# Patient Record
Sex: FEMALE | Race: WHITE | Employment: UNEMPLOYED | URBAN - METROPOLITAN AREA
[De-identification: names, ages, dates, MRNs, and addresses within clinical notes are randomized per-mention and may not be internally consistent; named-entity substitution may affect disease eponyms.]

---

## 2022-12-19 ENCOUNTER — APPOINTMENT (OUTPATIENT)
Dept: GENERAL RADIOLOGY | Age: 52
End: 2022-12-19
Payer: MEDICAID

## 2022-12-19 ENCOUNTER — APPOINTMENT (OUTPATIENT)
Dept: CT IMAGING | Age: 52
End: 2022-12-19
Payer: MEDICAID

## 2022-12-19 ENCOUNTER — HOSPITAL ENCOUNTER (INPATIENT)
Age: 52
LOS: 2 days | Discharge: HOME | End: 2022-12-22
Attending: EMERGENCY MEDICINE | Admitting: INTERNAL MEDICINE
Payer: MEDICAID

## 2022-12-19 DIAGNOSIS — R19.7 NAUSEA VOMITING AND DIARRHEA: ICD-10-CM

## 2022-12-19 DIAGNOSIS — K52.9 ENTERITIS: ICD-10-CM

## 2022-12-19 DIAGNOSIS — R11.2 NAUSEA VOMITING AND DIARRHEA: ICD-10-CM

## 2022-12-19 DIAGNOSIS — R10.9 ABDOMINAL PAIN, UNSPECIFIED ABDOMINAL LOCATION: ICD-10-CM

## 2022-12-19 DIAGNOSIS — K57.32 DIVERTICULITIS OF COLON: Primary | ICD-10-CM

## 2022-12-19 DIAGNOSIS — I49.8 VENTRICULAR BIGEMINY: ICD-10-CM

## 2022-12-19 LAB
A/G RATIO: 1.5 (ref 1.1–2.2)
ALBUMIN SERPL-MCNC: 4.8 G/DL (ref 3.4–5)
ALP BLD-CCNC: 85 U/L (ref 40–129)
ALT SERPL-CCNC: 11 U/L (ref 10–40)
ANION GAP SERPL CALCULATED.3IONS-SCNC: 18 MMOL/L (ref 3–16)
APTT: 29.4 SEC (ref 23–34.3)
AST SERPL-CCNC: 17 U/L (ref 15–37)
BASE EXCESS VENOUS: -3.9 MMOL/L (ref -3–3)
BASOPHILS ABSOLUTE: 0.1 K/UL (ref 0–0.2)
BASOPHILS RELATIVE PERCENT: 0.4 %
BILIRUB SERPL-MCNC: 0.3 MG/DL (ref 0–1)
BUN BLDV-MCNC: 10 MG/DL (ref 7–20)
CALCIUM SERPL-MCNC: 10.2 MG/DL (ref 8.3–10.6)
CHLORIDE BLD-SCNC: 105 MMOL/L (ref 99–110)
CO2: 20 MMOL/L (ref 21–32)
CREAT SERPL-MCNC: <0.5 MG/DL (ref 0.6–1.1)
EOSINOPHILS ABSOLUTE: 0.1 K/UL (ref 0–0.6)
EOSINOPHILS RELATIVE PERCENT: 0.4 %
GFR SERPL CREATININE-BSD FRML MDRD: >60 ML/MIN/{1.73_M2}
GLUCOSE BLD-MCNC: 238 MG/DL (ref 70–99)
HCO3 VENOUS: 21.1 MMOL/L (ref 23–29)
HCT VFR BLD CALC: 40 % (ref 36–48)
HEMOGLOBIN: 13.5 G/DL (ref 12–16)
LACTIC ACID: 4.5 MMOL/L (ref 0.4–2)
LYMPHOCYTES ABSOLUTE: 2.3 K/UL (ref 1–5.1)
LYMPHOCYTES RELATIVE PERCENT: 11.4 %
MAGNESIUM: 1.9 MG/DL (ref 1.8–2.4)
MCH RBC QN AUTO: 30.4 PG (ref 26–34)
MCHC RBC AUTO-ENTMCNC: 33.7 G/DL (ref 31–36)
MCV RBC AUTO: 90.2 FL (ref 80–100)
MONOCYTES ABSOLUTE: 0.8 K/UL (ref 0–1.3)
MONOCYTES RELATIVE PERCENT: 4 %
NEUTROPHILS ABSOLUTE: 17.3 K/UL (ref 1.7–7.7)
NEUTROPHILS RELATIVE PERCENT: 83.8 %
O2 SAT, VEN: 99 %
O2 THERAPY: ABNORMAL
PCO2, VEN: 35.2 MMHG (ref 40–50)
PDW BLD-RTO: 13.4 % (ref 12.4–15.4)
PH VENOUS: 7.39 (ref 7.35–7.45)
PLATELET # BLD: 257 K/UL (ref 135–450)
PMV BLD AUTO: 9.3 FL (ref 5–10.5)
PO2, VEN: 170.7 MMHG (ref 25–40)
POTASSIUM SERPL-SCNC: 3.6 MMOL/L (ref 3.5–5.1)
PRO-BNP: 386 PG/ML (ref 0–124)
RBC # BLD: 4.44 M/UL (ref 4–5.2)
SODIUM BLD-SCNC: 143 MMOL/L (ref 136–145)
TCO2 CALC VENOUS: 20 MMOL/L
TOTAL PROTEIN: 7.9 G/DL (ref 6.4–8.2)
TROPONIN: <0.01 NG/ML
WBC # BLD: 20.6 K/UL (ref 4–11)

## 2022-12-19 PROCEDURE — 2580000003 HC RX 258: Performed by: EMERGENCY MEDICINE

## 2022-12-19 PROCEDURE — 85730 THROMBOPLASTIN TIME PARTIAL: CPT

## 2022-12-19 PROCEDURE — 87040 BLOOD CULTURE FOR BACTERIA: CPT

## 2022-12-19 PROCEDURE — 82803 BLOOD GASES ANY COMBINATION: CPT

## 2022-12-19 PROCEDURE — 6360000004 HC RX CONTRAST MEDICATION: Performed by: EMERGENCY MEDICINE

## 2022-12-19 PROCEDURE — 93005 ELECTROCARDIOGRAM TRACING: CPT | Performed by: EMERGENCY MEDICINE

## 2022-12-19 PROCEDURE — 87086 URINE CULTURE/COLONY COUNT: CPT

## 2022-12-19 PROCEDURE — 83605 ASSAY OF LACTIC ACID: CPT

## 2022-12-19 PROCEDURE — 85025 COMPLETE CBC W/AUTO DIFF WBC: CPT

## 2022-12-19 PROCEDURE — 96361 HYDRATE IV INFUSION ADD-ON: CPT

## 2022-12-19 PROCEDURE — 96374 THER/PROPH/DIAG INJ IV PUSH: CPT

## 2022-12-19 PROCEDURE — 74177 CT ABD & PELVIS W/CONTRAST: CPT

## 2022-12-19 PROCEDURE — 84484 ASSAY OF TROPONIN QUANT: CPT

## 2022-12-19 PROCEDURE — 83735 ASSAY OF MAGNESIUM: CPT

## 2022-12-19 PROCEDURE — 6360000002 HC RX W HCPCS: Performed by: EMERGENCY MEDICINE

## 2022-12-19 PROCEDURE — 99285 EMERGENCY DEPT VISIT HI MDM: CPT

## 2022-12-19 PROCEDURE — 71045 X-RAY EXAM CHEST 1 VIEW: CPT

## 2022-12-19 PROCEDURE — 96375 TX/PRO/DX INJ NEW DRUG ADDON: CPT

## 2022-12-19 PROCEDURE — 80053 COMPREHEN METABOLIC PANEL: CPT

## 2022-12-19 PROCEDURE — 83880 ASSAY OF NATRIURETIC PEPTIDE: CPT

## 2022-12-19 RX ORDER — PROCHLORPERAZINE EDISYLATE 5 MG/ML
5 INJECTION INTRAMUSCULAR; INTRAVENOUS ONCE
Status: COMPLETED | OUTPATIENT
Start: 2022-12-19 | End: 2022-12-19

## 2022-12-19 RX ORDER — ONDANSETRON 2 MG/ML
4 INJECTION INTRAMUSCULAR; INTRAVENOUS ONCE
Status: DISCONTINUED | OUTPATIENT
Start: 2022-12-19 | End: 2022-12-19

## 2022-12-19 RX ORDER — 0.9 % SODIUM CHLORIDE 0.9 %
1000 INTRAVENOUS SOLUTION INTRAVENOUS ONCE
Status: COMPLETED | OUTPATIENT
Start: 2022-12-19 | End: 2022-12-19

## 2022-12-19 RX ORDER — FENTANYL CITRATE 50 UG/ML
50 INJECTION, SOLUTION INTRAMUSCULAR; INTRAVENOUS ONCE
Status: COMPLETED | OUTPATIENT
Start: 2022-12-19 | End: 2022-12-19

## 2022-12-19 RX ADMIN — IOHEXOL 100 ML: 350 INJECTION, SOLUTION INTRAVENOUS at 21:44

## 2022-12-19 RX ADMIN — SODIUM CHLORIDE 1000 ML: 0.9 INJECTION, SOLUTION INTRAVENOUS at 20:51

## 2022-12-19 RX ADMIN — FENTANYL CITRATE 50 MCG: 50 INJECTION INTRAMUSCULAR; INTRAVENOUS at 20:50

## 2022-12-19 RX ADMIN — PROCHLORPERAZINE EDISYLATE 5 MG: 5 INJECTION INTRAMUSCULAR; INTRAVENOUS at 21:06

## 2022-12-19 ASSESSMENT — PAIN - FUNCTIONAL ASSESSMENT: PAIN_FUNCTIONAL_ASSESSMENT: 0-10

## 2022-12-19 ASSESSMENT — PAIN DESCRIPTION - LOCATION: LOCATION: ABDOMEN

## 2022-12-19 ASSESSMENT — PAIN SCALES - GENERAL
PAINLEVEL_OUTOF10: 2
PAINLEVEL_OUTOF10: 7

## 2022-12-19 ASSESSMENT — PAIN DESCRIPTION - DESCRIPTORS: DESCRIPTORS: ACHING

## 2022-12-20 PROBLEM — K57.92 DIVERTICULITIS: Status: ACTIVE | Noted: 2022-12-20

## 2022-12-20 PROBLEM — R10.9 CONTINUOUS SEVERE ABDOMINAL PAIN: Status: ACTIVE | Noted: 2022-12-20

## 2022-12-20 LAB
AMORPHOUS: ABNORMAL /HPF
ANION GAP SERPL CALCULATED.3IONS-SCNC: 9 MMOL/L (ref 3–16)
BACTERIA: ABNORMAL /HPF
BASOPHILS ABSOLUTE: 0 K/UL (ref 0–0.2)
BASOPHILS RELATIVE PERCENT: 0.5 %
BILIRUBIN URINE: NEGATIVE
BLOOD, URINE: ABNORMAL
BUN BLDV-MCNC: 6 MG/DL (ref 7–20)
C DIFF TOXIN/ANTIGEN: NORMAL
CALCIUM SERPL-MCNC: 9 MG/DL (ref 8.3–10.6)
CHLORIDE BLD-SCNC: 107 MMOL/L (ref 99–110)
CLARITY: CLEAR
CO2: 26 MMOL/L (ref 21–32)
COLOR: YELLOW
CREAT SERPL-MCNC: <0.5 MG/DL (ref 0.6–1.1)
EKG ATRIAL RATE: 49 BPM
EKG ATRIAL RATE: 59 BPM
EKG DIAGNOSIS: NORMAL
EKG DIAGNOSIS: NORMAL
EKG P AXIS: 64 DEGREES
EKG P-R INTERVAL: 164 MS
EKG Q-T INTERVAL: 446 MS
EKG Q-T INTERVAL: 476 MS
EKG QRS DURATION: 88 MS
EKG QRS DURATION: 96 MS
EKG QTC CALCULATION (BAZETT): 441 MS
EKG QTC CALCULATION (BAZETT): 467 MS
EKG R AXIS: 58 DEGREES
EKG R AXIS: 65 DEGREES
EKG T AXIS: 87 DEGREES
EKG T AXIS: 90 DEGREES
EKG VENTRICULAR RATE: 58 BPM
EKG VENTRICULAR RATE: 59 BPM
EOSINOPHILS ABSOLUTE: 0.1 K/UL (ref 0–0.6)
EOSINOPHILS RELATIVE PERCENT: 1 %
EPITHELIAL CELLS, UA: ABNORMAL /HPF (ref 0–5)
GFR SERPL CREATININE-BSD FRML MDRD: >60 ML/MIN/{1.73_M2}
GLUCOSE BLD-MCNC: 86 MG/DL (ref 70–99)
GLUCOSE URINE: NEGATIVE MG/DL
HCT VFR BLD CALC: 33.4 % (ref 36–48)
HEMOGLOBIN: 11.7 G/DL (ref 12–16)
HYALINE CASTS: ABNORMAL /LPF (ref 0–2)
KETONES, URINE: NEGATIVE MG/DL
LACTIC ACID: 1.7 MMOL/L (ref 0.4–2)
LEUKOCYTE ESTERASE, URINE: NEGATIVE
LYMPHOCYTES ABSOLUTE: 2.6 K/UL (ref 1–5.1)
LYMPHOCYTES RELATIVE PERCENT: 37.9 %
MAGNESIUM: 1.7 MG/DL (ref 1.8–2.4)
MCH RBC QN AUTO: 31.5 PG (ref 26–34)
MCHC RBC AUTO-ENTMCNC: 35 G/DL (ref 31–36)
MCV RBC AUTO: 89.9 FL (ref 80–100)
MICROSCOPIC EXAMINATION: YES
MONOCYTES ABSOLUTE: 0.5 K/UL (ref 0–1.3)
MONOCYTES RELATIVE PERCENT: 7.6 %
MUCUS: ABNORMAL /LPF
NEUTROPHILS ABSOLUTE: 3.6 K/UL (ref 1.7–7.7)
NEUTROPHILS RELATIVE PERCENT: 53 %
NITRITE, URINE: POSITIVE
PDW BLD-RTO: 13.1 % (ref 12.4–15.4)
PH UA: 5 (ref 5–8)
PLATELET # BLD: 168 K/UL (ref 135–450)
PMV BLD AUTO: 8.9 FL (ref 5–10.5)
POTASSIUM REFLEX MAGNESIUM: 3.4 MMOL/L (ref 3.5–5.1)
PROTEIN UA: NEGATIVE MG/DL
RBC # BLD: 3.71 M/UL (ref 4–5.2)
RBC UA: ABNORMAL /HPF (ref 0–4)
SODIUM BLD-SCNC: 142 MMOL/L (ref 136–145)
SPECIFIC GRAVITY UA: <=1.005 (ref 1–1.03)
URINE REFLEX TO CULTURE: YES
URINE TYPE: ABNORMAL
UROBILINOGEN, URINE: 0.2 E.U./DL
WBC # BLD: 6.8 K/UL (ref 4–11)
WBC UA: ABNORMAL /HPF (ref 0–5)

## 2022-12-20 PROCEDURE — 1200000000 HC SEMI PRIVATE

## 2022-12-20 PROCEDURE — 93010 ELECTROCARDIOGRAM REPORT: CPT | Performed by: INTERNAL MEDICINE

## 2022-12-20 PROCEDURE — 2580000003 HC RX 258

## 2022-12-20 PROCEDURE — 96365 THER/PROPH/DIAG IV INF INIT: CPT

## 2022-12-20 PROCEDURE — 36415 COLL VENOUS BLD VENIPUNCTURE: CPT

## 2022-12-20 PROCEDURE — 80048 BASIC METABOLIC PNL TOTAL CA: CPT

## 2022-12-20 PROCEDURE — 81001 URINALYSIS AUTO W/SCOPE: CPT

## 2022-12-20 PROCEDURE — 6370000000 HC RX 637 (ALT 250 FOR IP)

## 2022-12-20 PROCEDURE — 6360000002 HC RX W HCPCS

## 2022-12-20 PROCEDURE — 96366 THER/PROPH/DIAG IV INF ADDON: CPT

## 2022-12-20 PROCEDURE — 6360000002 HC RX W HCPCS: Performed by: EMERGENCY MEDICINE

## 2022-12-20 PROCEDURE — 36591 DRAW BLOOD OFF VENOUS DEVICE: CPT

## 2022-12-20 PROCEDURE — 87493 C DIFF AMPLIFIED PROBE: CPT

## 2022-12-20 PROCEDURE — 96375 TX/PRO/DX INJ NEW DRUG ADDON: CPT

## 2022-12-20 PROCEDURE — 83036 HEMOGLOBIN GLYCOSYLATED A1C: CPT

## 2022-12-20 PROCEDURE — 36592 COLLECT BLOOD FROM PICC: CPT

## 2022-12-20 PROCEDURE — 2580000003 HC RX 258: Performed by: EMERGENCY MEDICINE

## 2022-12-20 PROCEDURE — C9113 INJ PANTOPRAZOLE SODIUM, VIA: HCPCS | Performed by: STUDENT IN AN ORGANIZED HEALTH CARE EDUCATION/TRAINING PROGRAM

## 2022-12-20 PROCEDURE — 6360000002 HC RX W HCPCS: Performed by: STUDENT IN AN ORGANIZED HEALTH CARE EDUCATION/TRAINING PROGRAM

## 2022-12-20 PROCEDURE — 87449 NOS EACH ORGANISM AG IA: CPT

## 2022-12-20 PROCEDURE — 85025 COMPLETE CBC W/AUTO DIFF WBC: CPT

## 2022-12-20 PROCEDURE — 87324 CLOSTRIDIUM AG IA: CPT

## 2022-12-20 PROCEDURE — 6370000000 HC RX 637 (ALT 250 FOR IP): Performed by: STUDENT IN AN ORGANIZED HEALTH CARE EDUCATION/TRAINING PROGRAM

## 2022-12-20 PROCEDURE — 83735 ASSAY OF MAGNESIUM: CPT

## 2022-12-20 RX ORDER — ALBUTEROL SULFATE 90 UG/1
2 AEROSOL, METERED RESPIRATORY (INHALATION) EVERY 6 HOURS PRN
COMMUNITY
Start: 2022-07-22

## 2022-12-20 RX ORDER — 0.9 % SODIUM CHLORIDE 0.9 %
500 INTRAVENOUS SOLUTION INTRAVENOUS ONCE
Status: COMPLETED | OUTPATIENT
Start: 2022-12-20 | End: 2022-12-20

## 2022-12-20 RX ORDER — MORPHINE SULFATE 2 MG/ML
2 INJECTION, SOLUTION INTRAMUSCULAR; INTRAVENOUS ONCE
Status: COMPLETED | OUTPATIENT
Start: 2022-12-20 | End: 2022-12-20

## 2022-12-20 RX ORDER — ACETAMINOPHEN 650 MG/1
650 SUPPOSITORY RECTAL EVERY 6 HOURS PRN
Status: DISCONTINUED | OUTPATIENT
Start: 2022-12-20 | End: 2022-12-22 | Stop reason: HOSPADM

## 2022-12-20 RX ORDER — CYANOCOBALAMIN 1000 UG/ML
1000 INJECTION, SOLUTION INTRAMUSCULAR; SUBCUTANEOUS
COMMUNITY
Start: 2022-05-31

## 2022-12-20 RX ORDER — PANTOPRAZOLE SODIUM 40 MG/10ML
40 INJECTION, POWDER, LYOPHILIZED, FOR SOLUTION INTRAVENOUS DAILY
Status: DISCONTINUED | OUTPATIENT
Start: 2022-12-20 | End: 2022-12-22 | Stop reason: HOSPADM

## 2022-12-20 RX ORDER — POLYETHYLENE GLYCOL 3350 17 G/17G
17 POWDER, FOR SOLUTION ORAL DAILY PRN
Status: DISCONTINUED | OUTPATIENT
Start: 2022-12-20 | End: 2022-12-22 | Stop reason: HOSPADM

## 2022-12-20 RX ORDER — PYRIDOSTIGMINE BROMIDE 60 MG/1
90 TABLET ORAL 3 TIMES DAILY
COMMUNITY
Start: 2022-08-20

## 2022-12-20 RX ORDER — SPIRONOLACTONE 25 MG/1
25 TABLET ORAL DAILY
COMMUNITY
Start: 2022-11-28

## 2022-12-20 RX ORDER — ONDANSETRON 2 MG/ML
4 INJECTION INTRAMUSCULAR; INTRAVENOUS EVERY 6 HOURS PRN
Status: DISCONTINUED | OUTPATIENT
Start: 2022-12-20 | End: 2022-12-22 | Stop reason: HOSPADM

## 2022-12-20 RX ORDER — SODIUM CHLORIDE, SODIUM LACTATE, POTASSIUM CHLORIDE, CALCIUM CHLORIDE 600; 310; 30; 20 MG/100ML; MG/100ML; MG/100ML; MG/100ML
INJECTION, SOLUTION INTRAVENOUS CONTINUOUS
Status: CANCELLED | OUTPATIENT
Start: 2022-12-20

## 2022-12-20 RX ORDER — ONDANSETRON 4 MG/1
4 TABLET, ORALLY DISINTEGRATING ORAL EVERY 8 HOURS PRN
Status: DISCONTINUED | OUTPATIENT
Start: 2022-12-20 | End: 2022-12-22 | Stop reason: HOSPADM

## 2022-12-20 RX ORDER — SPIRONOLACTONE 25 MG/1
25 TABLET ORAL DAILY
Status: DISCONTINUED | OUTPATIENT
Start: 2022-12-20 | End: 2022-12-22 | Stop reason: HOSPADM

## 2022-12-20 RX ORDER — PYRIDOSTIGMINE BROMIDE 60 MG/1
90 TABLET ORAL 3 TIMES DAILY
Status: DISCONTINUED | OUTPATIENT
Start: 2022-12-20 | End: 2022-12-22 | Stop reason: HOSPADM

## 2022-12-20 RX ORDER — SODIUM CHLORIDE 0.9 % (FLUSH) 0.9 %
5-40 SYRINGE (ML) INJECTION EVERY 12 HOURS SCHEDULED
Status: DISCONTINUED | OUTPATIENT
Start: 2022-12-20 | End: 2022-12-22 | Stop reason: HOSPADM

## 2022-12-20 RX ORDER — SODIUM CHLORIDE 9 MG/ML
INJECTION, SOLUTION INTRAVENOUS PRN
Status: DISCONTINUED | OUTPATIENT
Start: 2022-12-20 | End: 2022-12-22 | Stop reason: HOSPADM

## 2022-12-20 RX ORDER — SODIUM CHLORIDE 0.9 % (FLUSH) 0.9 %
5-40 SYRINGE (ML) INJECTION PRN
Status: DISCONTINUED | OUTPATIENT
Start: 2022-12-20 | End: 2022-12-22 | Stop reason: HOSPADM

## 2022-12-20 RX ORDER — ENOXAPARIN SODIUM 100 MG/ML
40 INJECTION SUBCUTANEOUS DAILY
Status: DISCONTINUED | OUTPATIENT
Start: 2022-12-20 | End: 2022-12-22 | Stop reason: HOSPADM

## 2022-12-20 RX ORDER — PANTOPRAZOLE SODIUM 40 MG/1
40 TABLET, DELAYED RELEASE ORAL
Status: DISCONTINUED | OUTPATIENT
Start: 2022-12-20 | End: 2022-12-20

## 2022-12-20 RX ORDER — METOPROLOL SUCCINATE 25 MG/1
12.5 TABLET, EXTENDED RELEASE ORAL DAILY
COMMUNITY
Start: 2022-11-02

## 2022-12-20 RX ORDER — METOPROLOL SUCCINATE 25 MG/1
12.5 TABLET, EXTENDED RELEASE ORAL DAILY
Status: DISCONTINUED | OUTPATIENT
Start: 2022-12-20 | End: 2022-12-22 | Stop reason: HOSPADM

## 2022-12-20 RX ORDER — PANTOPRAZOLE SODIUM 40 MG/1
TABLET, DELAYED RELEASE ORAL
COMMUNITY
Start: 2022-05-13

## 2022-12-20 RX ORDER — ACETAMINOPHEN 325 MG/1
650 TABLET ORAL EVERY 6 HOURS PRN
Status: DISCONTINUED | OUTPATIENT
Start: 2022-12-20 | End: 2022-12-22 | Stop reason: HOSPADM

## 2022-12-20 RX ADMIN — PYRIDOSTIGMINE BROMIDE 90 MG: 60 TABLET ORAL at 16:20

## 2022-12-20 RX ADMIN — PYRIDOSTIGMINE BROMIDE 90 MG: 60 TABLET ORAL at 19:50

## 2022-12-20 RX ADMIN — METOPROLOL SUCCINATE 12.5 MG: 25 TABLET, EXTENDED RELEASE ORAL at 16:49

## 2022-12-20 RX ADMIN — PIPERACILLIN AND TAZOBACTAM 3375 MG: 3; .375 INJECTION, POWDER, FOR SOLUTION INTRAVENOUS at 16:35

## 2022-12-20 RX ADMIN — MORPHINE SULFATE 2 MG: 2 INJECTION, SOLUTION INTRAMUSCULAR; INTRAVENOUS at 03:55

## 2022-12-20 RX ADMIN — PIPERACILLIN AND TAZOBACTAM 3375 MG: 3; .375 INJECTION, POWDER, FOR SOLUTION INTRAVENOUS at 22:31

## 2022-12-20 RX ADMIN — PIPERACILLIN AND TAZOBACTAM 3375 MG: 3; .375 INJECTION, POWDER, LYOPHILIZED, FOR SOLUTION INTRAVENOUS at 00:00

## 2022-12-20 RX ADMIN — PANTOPRAZOLE SODIUM 40 MG: 40 INJECTION, POWDER, LYOPHILIZED, FOR SOLUTION INTRAVENOUS at 16:21

## 2022-12-20 RX ADMIN — SODIUM CHLORIDE 500 ML: 9 INJECTION, SOLUTION INTRAVENOUS at 15:54

## 2022-12-20 RX ADMIN — ENOXAPARIN SODIUM 40 MG: 100 INJECTION SUBCUTANEOUS at 16:37

## 2022-12-20 ASSESSMENT — PAIN DESCRIPTION - LOCATION: LOCATION: ABDOMEN

## 2022-12-20 ASSESSMENT — PAIN SCALES - GENERAL
PAINLEVEL_OUTOF10: 1
PAINLEVEL_OUTOF10: 6
PAINLEVEL_OUTOF10: 1

## 2022-12-20 ASSESSMENT — PAIN DESCRIPTION - ONSET: ONSET: ON-GOING

## 2022-12-20 ASSESSMENT — PAIN DESCRIPTION - PAIN TYPE: TYPE: ACUTE PAIN

## 2022-12-20 ASSESSMENT — PAIN DESCRIPTION - ORIENTATION: ORIENTATION: MID

## 2022-12-20 ASSESSMENT — PAIN DESCRIPTION - DESCRIPTORS: DESCRIPTORS: DISCOMFORT;CRAMPING

## 2022-12-20 ASSESSMENT — PAIN DESCRIPTION - FREQUENCY: FREQUENCY: INTERMITTENT

## 2022-12-20 ASSESSMENT — PAIN - FUNCTIONAL ASSESSMENT: PAIN_FUNCTIONAL_ASSESSMENT: ACTIVITIES ARE NOT PREVENTED

## 2022-12-20 NOTE — FLOWSHEET NOTE
12/20/22 1539   Vitals   Temp 97.3 °F (36.3 °C)   Temp Source Oral   Heart Rate 60   Heart Rate Source Monitor   Resp 18   /68   MAP (Calculated) 83   Level of Consciousness 0   MEWS Score 1   Oxygen Therapy   SpO2 100 %   O2 Device None (Room air)     Patient arrived to unit. Oriented patient to room and floor. Placed on heart monitor. Labs drawn. Fluids started . Will review orders. Patient placed in contact isolation for rule out C diff. Hat placed in toilet to obtain stool specimen.

## 2022-12-20 NOTE — PROGRESS NOTES
4 Eyes Admission Assessment     I agree as the admission nurse that 2 RN's have performed a thorough Head to Toe Skin Assessment on the patient. ALL assessment sites listed below have been assessed on admission. Areas assessed by both nurses:   [x]   Head, Face, and Ears   [x]   Shoulders, Back, and Chest  [x]   Arms, Elbows, and Hands   [x]   Coccyx, Sacrum, and Ischum  [x]   Legs, Feet, and Heels        Does the Patient have Skin Breakdown?   No         Sage Prevention initiated:  No   Wound Care Orders initiated:  No      [facility] nurse consulted for Pressure Injury (Stage 3,4, Unstageable, DTI, NWPT, and Complex wounds):  No      Nurse 1 eSignature: Electronically signed by Jony Orellana RN on 12/20/22 at 5:28 PM EST    **SHARE this note so that the co-signing nurse is able to place an eSignature**    Nurse 2 eSignature: Electronically signed by Saida Dominique RN on 12/20/22 at 5:28 PM EST

## 2022-12-20 NOTE — CARE COORDINATION
Case Management Assessment  Initial Evaluation    Date/Time of Evaluation: 12/20/2022 2:24 PM  Assessment Completed by: Shagufta Roblero RN    If patient is discharged prior to next notation, then this note serves as note for discharge by case management. Patient Name: Samson Gomez                   YOB: 1970  Diagnosis: Continuous severe abdominal pain [R10.9]                   Date / Time: 12/19/2022  7:58 PM    Patient Admission Status: Inpatient   Readmission Risk (Low < 19, Mod (19-27), High > 27): Readmission Risk Score: 8    Current PCP: No primary care provider on file. PCP verified by CM? Yes    Chart Reviewed: Yes      History Provided by: Patient  Patient Orientation: Alert and Oriented    Patient Cognition: Alert    Advance Directives:    FULL CODE    Discharge Planning:    Patient lives with: Alone Type of Home: Apartment 1st floor 5STE  Primary Care Giver: Self  Patient Support Systems include: Family Members, Home Care Staff, Friends/Neighbors   Current Financial resources: Medicare  Current services prior to admission: Durable Medical Equipment, SERAFIN/Passport (Active w [employer] [place] waiver services for an aide 4h 3x week. [employer])            Current DME: Lostine Yeni, Shower Chair (rollator walker)            Type of Home Care services:  PT, OT, Nursing Services    ADLS  Prior functional level: Assistance with the following:, Housework, Shopping, Mobility (Independent with a rolling walker)  Current functional level:  (TBD-pending PT/OT eval's)    Family can provide assistance at DC: No (lives alone)? Plans to Return to Present Housing: Yes  Potential Assistance needed at discharge: SERAFIN/Passport, Home Care (ROS w [employer]. Open to [facility] used them in the past)            Potential DME:  DEFERRED  Patient expects to discharge to: [address] [place] [address] for transportation at discharge: Will need assistance with transportation home. Lyft?     Financial    Payor: Kaelyn [place] MEDICAID / Plan: [address] / Product Type: *No Product type* /     Does insurance require precert for SNF: No    Potential assistance Purchasing Medications: No  Meds-to-Beds request:        [facility]/pharmacy #[id number], [place] [phone] - F [phone]  [address] [place]  Phone: [phone] Fax: [phone]      Notes: Additional Case Management Notes: CM met with Ms. Edwards at the bedside in the emergency department. She is alert and oriented x 4, pleasant, and easy to engage in conversation. She stated she lives at home alone in her own apartment. She is independent with self care and functional mobility. She uses a rollator walker when she leaves home. No AD needed to ambulate in the apartment. She has a shower chair. She is active with [facility] waiver services. A home health aide comes for 4h 3 times per week. She goes to [address] [place] [address] [facility] infusions every 3 weeks. Her insurance provides transportation or she takes a Lyft/cab. She has 3 friends who live close and provide emotional support. Her family lives in [place]. At discharge, she stated she is open to home healthcare (SN/PT/OT) to help with recovery. She has used [facility] in the past and would be happy with a referral for services. Apparently [facility] is working with [facility] to transfer the pt for a GI consult there. No beds are available at this time. The Plan for Transition of Care is related to the following treatment goals of Continuous severe abdominal pain [R10.9]:        The Patient and/or Patient Representative Agree with the Discharge Plan?  yes    Isa Escobar RN  Case Management Department  [phone]

## 2022-12-20 NOTE — ED NOTES
Pt transferred from [address] [place] [address]. PT oriented to unit and call light within reach.       Darling Luna RN  12/20/22 0043

## 2022-12-20 NOTE — PROGRESS NOTES
Pharmacy Note - Extended Infusion Beta-Lactam Adjustment    Piperacillin/Tazobactam ordered for treatment of Intra-Ab. Per [address] Extended Infusion Beta-Lactam Policy, Zosyn will be changed to 3.375g q8hr EI. Estimated Creatinine Clearance: Estimated Creatinine Clearance: 129 mL/min (based on SCr of 0.5 mg/dL). Dialysis Status, SELENE, CKD: None  BMI: Body mass index is 20.09 kg/m². Rationale for Adjustment: Agent is renally eliminated and demonstrates time-dependent effect on bacterial eradication. Extended-infusion dosing strategy aims to enhance microbiologic and clinical efficacy. Pharmacy will continue to monitor renal function, cultures and sensitivities (where available) and adjust dose as necessary. Please call with any questions.     J Luis GarciaD  Main Pharmacy: [id number]  12/20/2022 2:36 PM

## 2022-12-20 NOTE — H&P
Internal Medicine  PGY 1  History & Physical      CC Abd pain    History Obtained From:  patient, electronic medical record    HISTORY OF PRESENT ILLNESS:  Sylvie Martinez is a  45 y.o. female w PMH myasthenia gravis, stress cardiomyopathy (EF 20-25% 9/22), recurrent diverticulitis, and breast cancer s/p bilateral mastectomy (2018), who presents via ambulance to the ED with complaints of one day h/o abominal pain with vomiting and diarrhea. Patient has a history of myasthenia gravis and states initially that she thought she was getting a reaction or side effects to her medication for the myasthenia. She states that her abdomen has felt twitchy and then she developed pain, vomiting, and diarrhea. States there may have been some blood in diarrhea initially. She has no definite fever. She denies chest pain or shortness of breath. She does feel lightheaded and generally weak. 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 During her last admission with the perforated diverticulitis and stress cardiomyopathy she did have a cardiac cath which showed normal coronaries but had an ejection fraction of 20 to 25%. She was supposed to have a colonoscopy this Monday (12/26/22) to determine whether or not she would need a colonic resection. In the ED, She is diffusely tender on exam.  She was not hypotensive however at times she was quite bradycardic into the upper 20s and low 30s. This was not captured on EKG however was seen on the monitor and felt to likely represent some vagal episodes and was mostly sinus bradycardia with ectopy. Patient also was having extensive ventricular ectopy. The ectopy did seem to calm down after she received fentanyl and Compazine and the vomiting improved. Patient was hydrated normal saline.   Her initial liter was given rapidly however her second liter to complete her 30 mill per kilo bolus was slowed given her history of low ejection fraction in the past.  Chest x-ray was clear. Labs significant for leukocytosis. No SELENE. Her lactic acidosis quickly improved with IV fluid hydration. CT imaging shows enteritis and colitis versus diverticulitis but no perforation or abscess or obstruction. She was started on zosyn. Given her complex past medical history and the results and cardiomyopathy believed to be induced by an episode of diverticulitis about 30 best served by admission to the hospital.    On interview after transfer to [facility], patient feeling much better. States abdominal pain is gone, as are nausea/vomiting and diarrhea. She states her constellation of symptoms is similar to what it has been in the past. She is not having any weakness or symptoms of Myasthenic crisis. ROS is unremarkable. Plan to admit for treatment of diverticulitis. Past Medical History:        Diagnosis Date    Cardiomyopathy ([facility] [address]     Myasthenia gravis ([facility] [address]        Past Surgical History:    History reviewed. No pertinent surgical history. Medications Priorto Admission:    Not in a hospital admission. Allergies:  Ceftin [cefuroxime], Ciprofloxacin, and Codeine    Social History:   TOBACCO:   reports that she has never smoked. She has never used smokeless tobacco.  ETOH:   reports that she does not currently use alcohol. DRUGS : denies  Patient currently lives alone    Family History:   History reviewed. No pertinent family history. Review of Systems    ROS: A 10 point review of systems was conducted, significant findings as noted in HPI. Physical Exam  Constitutional:       General: She is not in acute distress. Appearance: She is obese. HENT:      Head: Normocephalic and atraumatic. Right Ear: External ear normal.      Left Ear: External ear normal.   Eyes:      Extraocular Movements: Extraocular movements intact. Conjunctiva/sclera: Conjunctivae normal.   Cardiovascular:      Rate and Rhythm: Normal rate and regular rhythm. Pulses: Normal pulses. Heart sounds: Normal heart sounds. Pulmonary:      Effort: Pulmonary effort is normal.      Breath sounds: Normal breath sounds. Abdominal:      General: Abdomen is flat. Palpations: Abdomen is soft. Tenderness: There is no abdominal tenderness. There is no guarding. Musculoskeletal:      Right lower leg: No edema. Left lower leg: No edema. Skin:     General: Skin is warm and dry. Neurological:      General: No focal deficit present. Mental Status: She is alert and oriented to person, place, and time. Mental status is at baseline. Psychiatric:         Mood and Affect: Mood normal.         Behavior: Behavior normal.     Physical exam:       Vitals:    12/20/22 1125   BP: 112/74   Pulse: 73   Resp: 18   Temp: 97.1 °F (36.2 °C)   SpO2: 100%       DATA:    Labs:  CBC:   Recent Labs     12/19/22 2036   WBC 20.6*   HGB 13.5   HCT 40.0          BMP:   Recent Labs     12/19/22 2036      K 3.6      CO2 20*   BUN 10   CREATININE <0.5*   GLUCOSE 238*     LFT's:   Recent Labs     12/19/22 2036   AST 17   ALT 11   BILITOT 0.3   ALKPHOS 85     Troponin:   Recent Labs     12/19/22 2036   [place] <0.01     BNP:No results for input(s): BNP in the last 72 hours. ABGs: No results for input(s): PHART, RGJ2GZH, PO2ART in the last 72 hours. INR: No results for input(s): INR in the last 72 hours. U/A:  Recent Labs     12/20/22  0557   COLORU Yellow   PHUR 5.0   WBCUA 21-50*   RBCUA 5-10*   MUCUS 3+*   BACTERIA 3+*   CLARITYU Clear   SPECGRAV <=1.005   LEUKOCYTESUR Negative   UROBILINOGEN 0.2   BILIRUBINUR Negative   BLOODU MODERATE*   GLUCOSEU Negative   AMORPHOUS 2+       CT ABDOMEN PELVIS W IV CONTRAST Additional Contrast? None   Final Result      1. Moderate sigmoid colitis versus diverticulitis without evidence of acute perforation or abscess. 2.  Moderate jejunal enteritis.          XR CHEST PORTABLE   Final Result      No acute pulmonary disease. ASSESSMENT AND PLAN:  Amos Nair is a  45 y.o. female w PMH myasthenia gravis, stress cardiomyopathy (EF 20-25% 9/22), recurrent diverticulitis, and breast cancer s/p bilateral mastectomy (2018), who presents via ambulance to the ED with complaints of one day h/o abominal pain with vomiting and diarrhea. #Diverticulitis  #Leukocytosis  Patient with hx of recurrent diverticulitis (6 episodes per pt), in the past c/b perforation and stress-induced cardiomyopathy. Now presenting with day of n/v/d, leukocytosis to 20.6. HDS and afebrile. CT abdomen w moderate sigmoid colitis versus diverticulitis without evidence of acute perforation or abscess. Moderate jejunal enteritis. - Continue zosyn  - IV hydration  - Zofran prn  - Clear liquid diet, advance as tolerated  - Daily CBC, BMP  - C Diff pending  - Blood cultures pending    #Hyperglycemia  Glucose of 238 in ED. No hx of A1c in chart  - Trend glucose in daily BMP  - Check A1c    #Lactic acidemia - resolved  LA 4.5 --> 1.7 s/p fluid bolus. Bicarb 20. Likely 2/2 diverticulitis.   - Cont IV rehydration    Chronic conditions  #Myasthenia Gravis - no signs/symptoms of crisis, continue home pyridostigmine  #Stress-induced cardiomyopathy (HFrEF) - EF 20-25% 9/22, pro- in ED, no signs or symptoms of acute exacerbation, continue home aldactone, metoprolol  #Breast ca s/p bilateral mastectomy (2018) - no intervention at this time    Will discuss with attending physician Dr. Aguayo Better    Code Status: Full code  FEN: CLD  PPX: Lovenox  DISPO: Aaron Morgan MD  12/20/2022,  2:04 PM

## 2022-12-20 NOTE — ED PROVIDER NOTES
[facility]  EMERGENCY DEPT VISIT      Patient Identification  Dara Miranda is a 45 y.o. female. Chief Complaint   Fatigue, Abdominal Pain, Diarrhea, and Emesis      History of Present Illness:    History was obtained from patient. This is a  45 y.o. female who presents via ambulance to the ED with complaints of one day h/o abominal pain with vomiting and diarrhea. .  Patient has a history of myasthenia gravis and states initially that she thought she was getting a reaction or side effects to her medication for the myasthenia. She states that her abdomen has felt twitchy and then she developed pain, vomiting, and diarrhea. No melena or hematochezia. She has no definite fever. She denies chest pain or shortness of breath. She does feel lightheaded and generally weak. 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 During her last admission with the perforated diverticulitis and stress cardiomyopathy she did have a cardiac cath which showed normal coronaries but had an ejection fraction of 20 to 25%. She was supposed to have a colonoscopy soon to determine whether or not she would need a colonic resection. Past Medical History:   Diagnosis Date    Cardiomyopathy ([facility] [address]     Myasthenia gravis ([facility] [address]        History reviewed. No pertinent surgical history. No current facility-administered medications for this encounter. No current outpatient medications on file.     Allergies   Allergen Reactions    Ceftin [Cefuroxime]     Ciprofloxacin     Codeine        Social History     Socioeconomic History    Marital status: Single     Spouse name: Not on file    Number of children: Not on file    Years of education: Not on file    Highest education level: Not on file   Occupational History    Not on file   Tobacco Use    Smoking status: Never    Smokeless tobacco: Never   Substance and Sexual Activity    Alcohol use: Not Currently    Drug use: Not on file    Sexual wheezing. HEART:  Regular rate and irregular rhythm. No murmurs. Strong and equal pulses in the upper and lower extremities. ABDOMEN: Soft,  nondistended, positive bowel sounds. abdomen is diffusely tender. No rebound. no guarding. MUSCULOSKELETAL: The calves are nontender to palpation. Active range of motion of the upper and lower extremities. No edema. NEUROLOGICAL: Awake, alert and oriented x 3. Power intact in the upper and lower extremities. Sensation is intact to light touch in the upper and lower extremities. Cranial Nerves 2-12 are intact. No truncal ataxia. No dysarthria or aphasia. Normal finger to nose. DERMATOLOGIC: No petechiae, rashes, or ecchymoses. No erythema. PSYCH: normal mood and affect. Normal thought content. ED COURSE AND MEDICAL DECISION MAKING:    Record Review:  I have reviewed [facility] old records which reveal the following pertinent information:   ECHO 9/22/22 Study Conclusions     - Left ventricle: The cavity size is mildly dilated. Wall thickness is normal. Systolic function was     moderately to severely reduced. The estimated ejection fraction was in the range of 30% to 35%. Akinesis of the mid to apical anterior, mid anterolateral, mid inferolateral, apical lateral, mid     anteroseptal, apical septal, and mid to apical inferior myocardium. Features are consistent with a     pseudonormal left ventricular filling pattern, with concomitant abnormal relaxation and increased     filling pressure (grade 2 diastolic dysfunction). There is no evidence of a thrombus revealed by     acoustic contrast opacification.   - Right ventricle: Systolic function was normal.   - Aortic valve: Trivial regurgitation.   - Mitral valve: Mild regurgitation.   - Left atrium: The atrium is dilated. - Tricuspid valve:  Moderate regurgitation.   - Atrial septum: Agitated saline contrast study in the baseline state, shows no right-to-left atrial     level shunt.   - Pulmonary arteries: Systolic pressure was estimated to be 55mm Hg assuming that the right atrial     pressure was 15 mmHg. - Inferior vena cava: The vessel was dilated. The respirophasic diameter changes were blunted (<     50%), consistent with elevated central venous pressure. Impressions:  Severe systolic dysfunction with regional wall motion   abnormalities suggestive of either multivessel obstructive coronary   artery disease or stress cardiomyopathy. CATHETERIZATION LAB STUDY     Patient:       Miguel Barber    Age:    46                         Study Date:        09/23/2022  Performing Physician: Deni Sher MD   Ordering Physician:   Namrata Jamison MD   Referring Physician:  Jj Davidson   Fellow:               MD Remberto Tapia MD     -------------------------------------------------------------------   Procedures performed:     - Right heart catheterization.     -------------------------------------------------------------------   IMPRESSIONS:  Normal hemodynamics on the right heart   catheterization. RECOMMENDATIONS:  Further plan as per inpatient team.      CATHETERIZATION LAB STUDY     Patient:        Miguel Barber     Age:    46                      Study Date:         09/21/2022   Performing Physician:        Aislinn Padgett MD   Ordering Physician:          Aislinn Padgett MD   Referring Physician:         Jj Davidson   Fellow:                      Emma Lopez MD     -------------------------------------------------------------------   Procedures performed:     - Left heart catheterization.   - Left coronary angiography.   - Right coronary angiography. - Left ventriculography.     -------------------------------------------------------------------   IMPRESSIONS:     1. Patent coronary arteries in this right dominant system. 2. LV gram with EF of 20-25% with wall motion abnormalities consistent with stress induced      cardiomyopathy. 3. LVEDP is 25 mm Hg.      RECOMMENDATIONS:  Admit to CVICU A service. Further management as per CVICU team.     -------------------------------------------------------------------   INDICATIONS:   ST elevated myocardial infarction. HISTORY:  HX OG BREAST CANCER, COPD, SMOKER      -------------------------------------------------------------------   CORONARY ARTERIES:   The coronary circulation is right dominant. The left main trifurcates into the LAD, a ramus intermedius, and   the left circumflex. The left anterior descending gives rise to 1   diagonal and 2 septals. The left circumflex gives rise to 2 obtuse   marginals. The right coronary gives rise to the posterior   descending artery and 3 posterolaterals. Left main:  Patent. LAD:  Patent. Ramus intermedius:  Patent. Left circumflex:  Patent. EKG as interpreted by myself:  normal sinus rhythm with a rate of 58 with frequent PVcs in pattern of bigeminy artifact present which limits interpretation  Axis is   Normal  QTc is   467ms  Intervals and Durations are unremarkable. Non specific ST-T wave changes appreciated. No evidence of acute ischemia. No prior EKG    The Ekg interpreted by me in the absence of a cardiologist shows. sinus bradycardia, rate=59 with frequent PVCs    Axis is   Normal  QTc is   441ms  Intervals and Durations are unremarkable. Non specific ST-T wave changes appreciated. No evidence of acute ischemia. Radiology:  Films have been read by radiologist as noted in chart unless otherwise stated. Other radiologic studies (i.e. CT, MRI, ultrasounds, etc ) have been interpreted by radiologist.       CT ABDOMEN PELVIS W IV CONTRAST Additional Contrast? None   Final Result      1. Moderate sigmoid colitis versus diverticulitis without evidence of acute perforation or abscess. 2.  Moderate jejunal enteritis. XR CHEST PORTABLE   Final Result      No acute pulmonary disease.          CT ABDOMEN PELVIS W IV CONTRAST Additional Contrast? None    Result Date: 12/19/2022  EXAM: CT ABDOMEN PELVIS W IV CONTRAST INDICATION: abd pain, vomiting, diarrhea, h/o perforated diverticulitis, COMPARISON: None. TECHNIQUE: Axial CT imaging obtained from lung bases through pelvis. Axial images and multiplanar reformatted images are provided for review. Up-to-date CT equipment and radiation dose reduction techniques were employed. IV Contrast: 80 cc Isovue 370 Oral Contrast: No. FINDINGS: The visualized lung bases are clear. The heart size is normal without pericardial effusion. The liver enhances homogenously. No focal intrahepatic lesions are seen. The gallbladder is normal without evidence of gallstones or gallbladder wall thickening. There is no intrahepatic or extrahepatic biliary ductal dilatation. The spleen enhances homogenously. The pancreas is normal. The adrenal glands are normal. The kidneys enhance symmetrically bilaterally. There is no hydronephrosis or hydroureter. The stomach is normal. There is moderate long segment jejunal wall thickening. There is no evidence of bowel obstruction. There is colonic diverticulosis with moderate wall thickening involving the sigmoid colon with ill-defined surrounding stranding. Is  no associated fluid collection to suggest abscess. There is no evidence of free air. There is no evidence of appendicitis. No lymphadenopathy is seen. The abdominal aorta is normal in course and caliber. The remaining enhanced abdominal vascular structures are normal. The urinary bladder appears normal. The uterus is unremarkable. There is borderline congestion of periuterine and left ovarian veins, not meeting criteria for pelvic congestion syndrome. There is no free fluid in the pelvis. Bone windows demonstrate no suspicious lytic or blastic lesions. 1.  Moderate sigmoid colitis versus diverticulitis without evidence of acute perforation or abscess. 2.  Moderate jejunal enteritis.      XR CHEST PORTABLE    Result Date: 12/19/2022  EXAM: PORTABLE AP CHEST X-RAY INDICATION: dizziness, COMPARISON: none FINDINGS: Left subclavian approach power port terminates in the distal SVC. There is no focal consolidation, pleural effusion, or pneumothorax. The cardiomediastinal silhouette is normal. The visible bony thorax is intact. No acute pulmonary disease.         Labs:  Results for orders placed or performed during the hospital encounter of 12/19/22   CBC with Auto Differential   Result Value Ref Range    WBC 20.6 (H) 4.0 - 11.0 K/uL    RBC 4.44 4.00 - 5.20 M/uL    Hemoglobin 13.5 12.0 - 16.0 g/dL    Hematocrit 40.0 36.0 - 48.0 %    MCV 90.2 80.0 - 100.0 fL    MCH 30.4 26.0 - 34.0 pg    MCHC 33.7 31.0 - 36.0 g/dL    RDW 13.4 12.4 - 15.4 %    Platelets 639 727 - 403 K/uL    MPV 9.3 5.0 - 10.5 fL    Neutrophils % 83.8 %    Lymphocytes % 11.4 %    Monocytes % 4.0 %    Eosinophils % 0.4 %    Basophils % 0.4 %    Neutrophils Absolute 17.3 (H) 1.7 - 7.7 K/uL    Lymphocytes Absolute 2.3 1.0 - 5.1 K/uL    Monocytes Absolute 0.8 0.0 - 1.3 K/uL    Eosinophils Absolute 0.1 0.0 - 0.6 K/uL    Basophils Absolute 0.1 0.0 - 0.2 K/uL   Comprehensive Metabolic Panel   Result Value Ref Range    Sodium 143 136 - 145 mmol/L    Potassium 3.6 3.5 - 5.1 mmol/L    Chloride 105 99 - 110 mmol/L    CO2 20 (L) 21 - 32 mmol/L    Anion Gap 18 (H) 3 - 16    Glucose 238 (H) 70 - 99 mg/dL    BUN 10 7 - 20 mg/dL    Creatinine <0.5 (L) 0.6 - 1.1 mg/dL    Est, Glom Filt Rate >60 >60    Calcium 10.2 8.3 - 10.6 mg/dL    Total Protein 7.9 6.4 - 8.2 g/dL    Albumin 4.8 3.4 - 5.0 g/dL    Albumin/Globulin Ratio 1.5 1.1 - 2.2    Total Bilirubin 0.3 0.0 - 1.0 mg/dL    Alkaline Phosphatase 85 40 - 129 U/L    ALT 11 10 - 40 U/L    AST 17 15 - 37 U/L   Magnesium   Result Value Ref Range    Magnesium 1.90 1.80 - 2.40 mg/dL   Troponin   Result Value Ref Range    Troponin <0.01 <0.01 ng/mL   Lactic Acid   Result Value Ref Range    Lactic Acid 4.5 (HH) 0.4 - 2.0 mmol/L   Blood Gas, Venous   Result Value Ref Range pH, Erick 7.386 7.350 - 7.450    pCO2, Erick 35.2 (L) 40.0 - 50.0 mmHg    pO2, Erick 170.7 (H) 25.0 - 40.0 mmHg    HCO3, Venous 21.1 (L) 23.0 - 29.0 mmol/L    Base Excess, Erick -3.9 (L) -3.0 - 3.0 mmol/L    O2 Sat, Erick 99 Not Established %    TC02 (Calc), Erick 20 Not Established mmol/L    O2 Therapy Unknown    Brain Natriuretic Peptide   Result Value Ref Range    Pro- (H) 0 - 124 pg/mL   APTT   Result Value Ref Range    aPTT 29.4 23.0 - 34.3 sec   Lactic Acid   Result Value Ref Range    Lactic Acid 1.7 0.4 - 2.0 mmol/L       Treatment in the department:  Patient received the following while in the ED. Medications   0.9 % sodium chloride bolus (0 mLs IntraVENous Stopped 12/19/22 2246)   fentaNYL (SUBLIMAZE) injection 50 mcg (50 mcg IntraVENous Given 12/19/22 2050)   prochlorperazine (COMPAZINE) injection 5 mg (5 mg IntraVENous Given 12/19/22 2106)   iohexol (OMNIPAQUE 350) solution 100 mL (100 mLs IntraVENous Given 12/19/22 2144)   piperacillin-tazobactam (ZOSYN) 3,375 mg in dextrose 5 % 50 mL IVPB (mini-bag) (3,375 mg IntraVENous New Bag 12/20/22 0000)     Patinet placed on monitor. Noted to have periods of extensive ectopy with bigeminy, couplets, bradycardia into the 30s (possibly vagally induced)    Repeat exam at 10:05 PM EST   shows pain improved 2/10. Less ectopy on monitor. No further vomiting. 10:59 PM EST  Sats 100%. No vomiting. Heartrate 80s without significant ectopy    Having additional episodes of ventricular bigeminy    Medical decision making with differential diagnosis:  Presents emergency department with 1 day history of abdominal pain, vomiting, and diarrhea. She is diffusely tender on exam.  She was not hypotensive however at times she was quite bradycardic into the upper 20s and low 30s. This was not captured on EKG however was seen on the monitor and felt to likely represent some vagal episodes and was mostly sinus bradycardia with ectopy.   Patient also was having extensive ventricular ectopy. She has a history of stress-induced cardiomyopathy with low ejection fraction just a few months ago. She does not have a defibrillator. The ectopy did seem to calm down after she received fentanyl and Compazine and the vomiting improved. She was not having chest pain or shortness of breath. She had a recent cardiac cath showing no significant coronary artery disease. Given her cardiomyopathy however she could be at risk of arrhythmias. Patient was hydrated normal saline. Her initial liter was given rapidly however her second liter to complete her 30 mill per kilo bolus was slowed given her history of low ejection fraction in the past.  Chest x-ray was clear. She had significant leukocytosis. Mild anion gap but no other significant electrolyte disturbance. No SELENE. Troponin was negative. Her lactic acidosis quickly improved with IV fluid hydration. CT imaging shows enteritis and colitis versus diverticulitis but no perforation or abscess or obstruction. She was started on empiric antibiotics. Given her complex past medical history and the results and cardiomyopathy believed to be induced by an episode of diverticulitis about 30 best served by admission to the hospital.  Most of her care is at [facility] and she preferred to go to  if possible    Spoke with Dr Scott Iglesias, . He is willing to accept patient however  has no beds and wait time will likely be 24 -48 hours      I spoke with Dr. Melissa Nguyen. We thoroughly discussed the history, physical exam, laboratory and imaging studies, as well as, emergency department course. Based upon that discussion, we've decided to admit Andrea Ruvalcaba for further observation and evaluation of Crystal Edwards's abdominal pain.   As I have deemed necessary from their history, physical and studies, I have considered and evaluated Andrea Ruvalcaba for the following diagnoses:  ACUTE APPENDICITIS, BOWEL OBSTRUCTION, CHOLECYSTITIS, DIVERTICULITIS, INCARCERATED HERNIA, PANCREATITIS, or PERFORATED BOWEL or ULCER, AAA, OBSTRUCTIVE UROPATHY, ISCHEMIC COLITIS, ACS, ARRHYTHMIA, ELECTROLYTE DISTURBANCE, SEVERE DEHYDRATION        Clinical Impression:  1. Diverticulitis of colon    2. Enteritis    3. Nausea vomiting and diarrhea    4. Abdominal pain, unspecified abdominal location    5. Ventricular bigeminy        Dispo:  Patient will be admitted at this time. Patient was informed of this decision and agrees with plan. I have discussed lab and xray findings with patient and they understand. Questions were answered to the best of my ability. Followup plan:  No follow-up provider specified. Discharge vitals:  Blood pressure 109/69, pulse 80, temperature (!) 96.2 °F (35.7 °C), temperature source Temporal, resp. rate 15, height 5' 10\" (1.778 m), weight 140 lb (63.5 kg), SpO2 95 %. Prescriptions given:   New Prescriptions    No medications on file       I personally saw the patient and independently provided 0 minutes of non-concurrent critical care out of the total shared critical care time provided. This chart was created using Dragon voice recognition software.         Amy Daley MD  12/20/22 5951

## 2022-12-20 NOTE — PLAN OF CARE
Problem: Pain  Goal: Verbalizes/displays adequate comfort level or baseline comfort level  Description: Patient pain level 1/10  Outcome: Progressing     Problem: Safety - Adult  Goal: Free from fall injury  12/20/2022 1716 by Jennyfer Kamara RN  Outcome: Progressing  12/20/2022 1714 by Jennyfer Kamara RN  Outcome: Progressing     Problem: Gastrointestinal - Adult  Goal: Minimal or absence of nausea and vomiting  Outcome: Progressing     Problem: Gastrointestinal - Adult  Goal: Maintains adequate nutritional intake  Outcome: Progressing

## 2022-12-20 NOTE — ED NOTES
Pt using bedside commode to void. Feels weak when she gets OOB, NSR monitor currently.  Pt goes in and out of ventricular bigeminy     Blanquita SRIKANTH Buckley  12/20/22 5640

## 2022-12-21 LAB
ANION GAP SERPL CALCULATED.3IONS-SCNC: 12 MMOL/L (ref 3–16)
BASOPHILS ABSOLUTE: 0 K/UL (ref 0–0.2)
BASOPHILS RELATIVE PERCENT: 0.6 %
BUN BLDV-MCNC: 4 MG/DL (ref 7–20)
C. DIFFICILE TOXIN MOLECULAR: ABNORMAL
CALCIUM SERPL-MCNC: 8.9 MG/DL (ref 8.3–10.6)
CHLORIDE BLD-SCNC: 105 MMOL/L (ref 99–110)
CO2: 23 MMOL/L (ref 21–32)
CREAT SERPL-MCNC: <0.5 MG/DL (ref 0.6–1.1)
EOSINOPHILS ABSOLUTE: 0.1 K/UL (ref 0–0.6)
EOSINOPHILS RELATIVE PERCENT: 1.9 %
ESTIMATED AVERAGE GLUCOSE: 108.3 MG/DL
GFR SERPL CREATININE-BSD FRML MDRD: >60 ML/MIN/{1.73_M2}
GLUCOSE BLD-MCNC: 77 MG/DL (ref 70–99)
HBA1C MFR BLD: 5.4 %
HCT VFR BLD CALC: 35.4 % (ref 36–48)
HEMOGLOBIN: 12.2 G/DL (ref 12–16)
LYMPHOCYTES ABSOLUTE: 2 K/UL (ref 1–5.1)
LYMPHOCYTES RELATIVE PERCENT: 38.1 %
MAGNESIUM: 1.7 MG/DL (ref 1.8–2.4)
MCH RBC QN AUTO: 30.9 PG (ref 26–34)
MCHC RBC AUTO-ENTMCNC: 34.4 G/DL (ref 31–36)
MCV RBC AUTO: 89.8 FL (ref 80–100)
MONOCYTES ABSOLUTE: 0.3 K/UL (ref 0–1.3)
MONOCYTES RELATIVE PERCENT: 6 %
NEUTROPHILS ABSOLUTE: 2.8 K/UL (ref 1.7–7.7)
NEUTROPHILS RELATIVE PERCENT: 53.4 %
ORGANISM: ABNORMAL
PDW BLD-RTO: 13.3 % (ref 12.4–15.4)
PLATELET # BLD: 153 K/UL (ref 135–450)
PMV BLD AUTO: 9.3 FL (ref 5–10.5)
POTASSIUM SERPL-SCNC: 3.4 MMOL/L (ref 3.5–5.1)
RBC # BLD: 3.95 M/UL (ref 4–5.2)
SODIUM BLD-SCNC: 140 MMOL/L (ref 136–145)
URINE CULTURE, ROUTINE: NORMAL
WBC # BLD: 5.2 K/UL (ref 4–11)

## 2022-12-21 PROCEDURE — 97165 OT EVAL LOW COMPLEX 30 MIN: CPT

## 2022-12-21 PROCEDURE — 6370000000 HC RX 637 (ALT 250 FOR IP)

## 2022-12-21 PROCEDURE — 97530 THERAPEUTIC ACTIVITIES: CPT

## 2022-12-21 PROCEDURE — 97161 PT EVAL LOW COMPLEX 20 MIN: CPT

## 2022-12-21 PROCEDURE — 85025 COMPLETE CBC W/AUTO DIFF WBC: CPT

## 2022-12-21 PROCEDURE — 1200000000 HC SEMI PRIVATE

## 2022-12-21 PROCEDURE — 97535 SELF CARE MNGMENT TRAINING: CPT

## 2022-12-21 PROCEDURE — 80048 BASIC METABOLIC PNL TOTAL CA: CPT

## 2022-12-21 PROCEDURE — 6360000002 HC RX W HCPCS

## 2022-12-21 PROCEDURE — 83735 ASSAY OF MAGNESIUM: CPT

## 2022-12-21 PROCEDURE — 2580000003 HC RX 258

## 2022-12-21 PROCEDURE — 6360000002 HC RX W HCPCS: Performed by: STUDENT IN AN ORGANIZED HEALTH CARE EDUCATION/TRAINING PROGRAM

## 2022-12-21 PROCEDURE — 6370000000 HC RX 637 (ALT 250 FOR IP): Performed by: STUDENT IN AN ORGANIZED HEALTH CARE EDUCATION/TRAINING PROGRAM

## 2022-12-21 PROCEDURE — 36415 COLL VENOUS BLD VENIPUNCTURE: CPT

## 2022-12-21 PROCEDURE — 97116 GAIT TRAINING THERAPY: CPT

## 2022-12-21 PROCEDURE — C9113 INJ PANTOPRAZOLE SODIUM, VIA: HCPCS | Performed by: STUDENT IN AN ORGANIZED HEALTH CARE EDUCATION/TRAINING PROGRAM

## 2022-12-21 RX ORDER — AMOXICILLIN AND CLAVULANATE POTASSIUM 875; 125 MG/1; MG/1
1 TABLET, FILM COATED ORAL EVERY 12 HOURS SCHEDULED
Status: DISCONTINUED | OUTPATIENT
Start: 2022-12-21 | End: 2022-12-22 | Stop reason: HOSPADM

## 2022-12-21 RX ORDER — AMOXICILLIN AND CLAVULANATE POTASSIUM 875; 125 MG/1; MG/1
1 TABLET, FILM COATED ORAL EVERY 12 HOURS SCHEDULED
Qty: 6 TABLET | Refills: 0 | Status: SHIPPED | OUTPATIENT
Start: 2022-12-21 | End: 2022-12-22 | Stop reason: SDUPTHER

## 2022-12-21 RX ORDER — LANOLIN ALCOHOL/MO/W.PET/CERES
400 CREAM (GRAM) TOPICAL ONCE
Status: COMPLETED | OUTPATIENT
Start: 2022-12-21 | End: 2022-12-21

## 2022-12-21 RX ORDER — LANOLIN ALCOHOL/MO/W.PET/CERES
400 CREAM (GRAM) TOPICAL 2 TIMES DAILY
Status: DISCONTINUED | OUTPATIENT
Start: 2022-12-21 | End: 2022-12-21

## 2022-12-21 RX ADMIN — Medication 125 MG: at 14:39

## 2022-12-21 RX ADMIN — PYRIDOSTIGMINE BROMIDE 90 MG: 60 TABLET ORAL at 08:56

## 2022-12-21 RX ADMIN — ENOXAPARIN SODIUM 40 MG: 100 INJECTION SUBCUTANEOUS at 08:55

## 2022-12-21 RX ADMIN — PYRIDOSTIGMINE BROMIDE 90 MG: 60 TABLET ORAL at 22:09

## 2022-12-21 RX ADMIN — SODIUM CHLORIDE, PRESERVATIVE FREE 20 ML: 5 INJECTION INTRAVENOUS at 10:22

## 2022-12-21 RX ADMIN — Medication 125 MG: at 18:10

## 2022-12-21 RX ADMIN — PIPERACILLIN AND TAZOBACTAM 3375 MG: 3; .375 INJECTION, POWDER, FOR SOLUTION INTRAVENOUS at 06:19

## 2022-12-21 RX ADMIN — METOPROLOL SUCCINATE 12.5 MG: 25 TABLET, EXTENDED RELEASE ORAL at 08:55

## 2022-12-21 RX ADMIN — POTASSIUM BICARBONATE 20 MEQ: 782 TABLET, EFFERVESCENT ORAL at 08:55

## 2022-12-21 RX ADMIN — PYRIDOSTIGMINE BROMIDE 90 MG: 60 TABLET ORAL at 14:16

## 2022-12-21 RX ADMIN — MAGNESIUM OXIDE TAB 400 MG (240 MG ELEMENTAL MG) 400 MG: 400 (240 MG) TAB at 10:26

## 2022-12-21 RX ADMIN — AMOXICILLIN AND CLAVULANATE POTASSIUM 1 TABLET: 875; 125 TABLET, FILM COATED ORAL at 22:09

## 2022-12-21 RX ADMIN — SODIUM CHLORIDE, PRESERVATIVE FREE 10 ML: 5 INJECTION INTRAVENOUS at 22:09

## 2022-12-21 RX ADMIN — PANTOPRAZOLE SODIUM 40 MG: 40 INJECTION, POWDER, LYOPHILIZED, FOR SOLUTION INTRAVENOUS at 10:31

## 2022-12-21 ASSESSMENT — PAIN DESCRIPTION - ORIENTATION
ORIENTATION: MID
ORIENTATION: LEFT

## 2022-12-21 ASSESSMENT — PAIN SCALES - GENERAL
PAINLEVEL_OUTOF10: 1
PAINLEVEL_OUTOF10: 0
PAINLEVEL_OUTOF10: 1
PAINLEVEL_OUTOF10: 0
PAINLEVEL_OUTOF10: 1
PAINLEVEL_OUTOF10: 0

## 2022-12-21 ASSESSMENT — PAIN DESCRIPTION - LOCATION
LOCATION: ABDOMEN

## 2022-12-21 ASSESSMENT — PAIN DESCRIPTION - DESCRIPTORS
DESCRIPTORS: ACHING
DESCRIPTORS: ACHING

## 2022-12-21 ASSESSMENT — PAIN DESCRIPTION - FREQUENCY: FREQUENCY: INTERMITTENT

## 2022-12-21 NOTE — PLAN OF CARE
Fall prevention discussed, use of call light, patient verbalizes understanding of information given. Will continue to monitor.

## 2022-12-21 NOTE — PLAN OF CARE
Problem: Safety - Adult  Goal: Free from fall injury  Outcome: Progressing   Fall prevention discussed call light use. Patient verbalizes understanding. Will continue to monitor.

## 2022-12-21 NOTE — CARE COORDINATION
SW met w/Pt at bedside during rounds this AM. Pt is aware of the plan and in agreement. Current DCP: transfer to  for GI services sicne she is established there, awaiting a bed at [facility]. HX: Pt is from home alone and indp at baseline. Pt uses a rollator in the community. Pt has a shower chair and is active w/[employer] waiver services. A home health aide comes for 4h 3 times per week. She goes to [facility] for IGG infusions every 3 weeks. Her insurance provides transportation or she takes a Lyft/cab. She has 3 friends who live close and provide emotional support. Her family lives in [place]. SW following.   Electronically signed by HENRY Cowan, LSW on 12/21/2022 at 2:43 PM  [phone]

## 2022-12-21 NOTE — PLAN OF CARE
Problem: Safety - Adult  Goal: Free from fall injury  12/21/2022 0353 by Mateusz Juarez RN  Outcome: Progressing  12/20/2022 1716 by Janna Rosa RN  Outcome: Progressing  12/20/2022 1714 by Janna Rosa RN  Outcome: Progressing   PT reminded to call before ambulation, PT verbalized understanding, called appropriately, alarm on call light within reach

## 2022-12-21 NOTE — PROGRESS NOTES
Physical Therapy  Facility/Department: [address] [place] [address]  Physical Therapy Initial Assessment    Name: Paul Ball  : 1970  MRN: [id number]  Date of Service: 2022    Discharge Recommendations: Paul Ball scored a 20/24 on the AM-PAC short mobility form. Current research shows that an AM-PAC score of 18 or greater is typically associated with a discharge to the patient's home setting. Based on the patient's AM-PAC score and their current functional mobility deficits, it is recommended that the patient have 2-3 sessions per week of Physical Therapy at d/c to increase the patient's independence. At this time, this patient demonstrates the endurance and safety to discharge home with home PT and a follow up treatment frequency of 2-3x/wk. Please see assessment section for further patient specific details. If patient discharges prior to next session this note will serve as a discharge summary. Please see below for the latest assessment towards goals. PT Equipment Recommendations  Equipment Needed: No      Patient Diagnosis(es): The primary encounter diagnosis was Diverticulitis of colon. Diagnoses of Enteritis, Nausea vomiting and diarrhea, Abdominal pain, unspecified abdominal location, and Ventricular bigeminy were also pertinent to this visit. Past Medical History:  has a past medical history of Cardiomyopathy ([address] and Myasthenia gravis ([address] Past Surgical History:  has no past surgical history on file. Assessment   Assessment: pt is a 46 yo female presenting with abdominal pain, N&V, and diarrhea with hx of diverticulitis and Myasthenia gravis from home alone with aide services 3 days a week for homemaking tasks and supervision with showers. Pt IND with mobility without AD in apartment and uses rollator in community.  pt has poor activity tolerance at baseline 2/2 past medical history and currently presents at baseline function with mobility and tolerance performing transfers and gait with SBA demonstrating steady but slow and cautious gait. pt expressing she has no concerns for returning home and does not think she is interested in home therapies at this time as she is performing routine exercise on her own. Recommend PRN A at dc, no further needs for acute PT noted. Therapy Prognosis: Good  Decision Making: Low Complexity  Requires PT Follow-Up: Yes  Activity Tolerance  Activity Tolerance: Patient tolerated evaluation without incident     Plan   Physcial Therapy Plan  General Plan: Discharge with evaluation only  Safety Devices  Type of Devices: Call light within reach, Gait belt, Nurse notified, Left in chair (RN requested no chair alarm)     Restrictions  Position Activity Restriction  Other position/activity restrictions: up with assist, contact plus     Subjective   General  Chart Reviewed: Yes  Additional Pertinent Hx: 45 y.o. female w PMH myasthenia gravis, stress cardiomyopathy (EF 20-25% 9/22), recurrent diverticulitis, and breast cancer s/p bilateral mastectomy (2018), who presents via ambulance to the ED with complaints of one day h/o abominal pain with vomiting and diarrhea. Patient has a history of myasthenia gravis and states initially that she thought she was getting a reaction or side effects to her medication for the myasthenia. She states that her abdomen has felt twitchy and then she developed pain, vomiting, and diarrhea. States there may have been some blood in diarrhea initially. She has no definite fever. She denies chest pain or shortness of breath. She does feel lightheaded and generally weak. 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 During her last admission with the perforated diverticulitis and stress cardiomyopathy she did have a cardiac cath which showed normal coronaries but had an ejection fraction of 20 to 25%.   She was supposed to have a colonoscopy this Monday (12/26/22) to determine whether or not she would need a colonic resection.   Referring Practitioner: Shannan Carmichael MD  Diagnosis: continuous severe abdominal pain  Follows Commands: Within Functional Limits  Subjective  Subjective: pt supine in bed and agreeable to PT, reports symptoms have resolved         Social/Functional History  Social/Functional History  Lives With: Alone  Type of Home: Apartment  Home Layout: One level  Home Access: Stairs to enter with rails  Entrance Stairs - Number of Steps: 5  Bathroom Shower/Tub: Walk-in shower  Bathroom Toilet: Standard (can push up from sink)  Bathroom Equipment: Shower chair  Home Equipment: Celeste Dickerson  Has the patient had two or more falls in the past year or any fall with injury in the past year?: No  Receives Help From: Home health, Friend(s)  ADL Assistance: Independent (showers with aide or someone close by)  [employer] Assistance: Needs assistance (aide performs laundry, cooking, cleaning (there 3x/week 4hrs/day- meal preps for other days))  Homemaking Responsibilities: No  Ambulation Assistance: Independent (rollator in community, furniture walks in apartment)  Transfer Assistance: Independent  Active : No  Patient's  Info: [employer] [facility], insurance covers 15 trips however pt goes through those fast  Vision/Hearing  Vision  Vision: Impaired  Vision Exceptions: Wears glasses at all times  Hearing  Hearing: Within functional limits    Cognition   Orientation  Overall Orientation Status: Within Functional Limits  Orientation Level: Oriented X4  Cognition  Overall Cognitive Status: WFL     Objective   Heart Rate: 69  Heart Rate Source: Monitor  BP: 122/79  BP Location: Right upper arm  BP Method: Automatic  Patient Position: Semi fowlers  MAP (Calculated): 93  Resp: 16  SpO2: 98 %  O2 Device: None (Room air)                 Strength RLE  Strength RLE: WFL  Strength LLE  Strength LLE: WFL           Bed mobility  Scooting: Supervision  Transfers  Sit to Stand: Stand by assistance (no AD from EOB, toilet, and recliner)  Stand to Sit: Stand by assistance  Ambulation  Surface: Level tile  Device: No Device  Assistance: Stand by assistance  Quality of Gait: occasionally reaching out for objects in the room however no overt LO, slow and guarded gait  Gait Deviations: Slow Isabela  Distance: 10' x2 + 61'  Comments: pt reports feeling at her baseline     Balance  Posture: Good  Sitting - Static: Good  Sitting - Dynamic: Good  Standing - Static: Fair;+ (supervision at sink)  Standing - Dynamic: Fair           OutComes Score                                                  AM-PAC Score  AM-PAC Inpatient Mobility Raw Score : 20 (12/21/22 1019)  AM-PAC Inpatient T-Scale Score : 47.67 (12/21/22 1019)  Mobility Inpatient CMS 0-100% Score: 35.83 (12/21/22 1019)  Mobility Inpatient CMS G-Code Modifier : CJ (12/21/22 1019)          Tinneti Score       Goals  Short Term Goals  Time Frame for Short Term Goals: no acute PT goals to be addressed       Education  Patient Education  Education Given To: Patient  Education Provided: Role of Therapy;Plan of Care  Education Method: Demonstration;Verbal  Barriers to Learning: None  Education Outcome: Verbalized understanding      Therapy Time   Individual Concurrent Group Co-treatment   Time In 0830         Time Out 0923         Minutes 53             Timed Code Treatment Minutes:  38 min     Total Treatment Minutes:  48 min       Rahel Ruvalcaba PT

## 2022-12-21 NOTE — PROGRESS NOTES
Occupational Therapy  Facility/Department: [address] Initial Assessment/ Treatment/ discharge    Name: Kendal Bosch  : 1970  MRN: [id number]  Date of Service: 2022    Discharge Recommendations:     OT Equipment Recommendations  Equipment Needed: No     Kendal Bosch scored a 22/24 on the AM-PAC ADL Inpatient form. Current research shows that an AM-PAC score of 18 or greater is typically associated with a discharge to the patient's home setting. Based on the patient's AM-PAC score, and their current ADL deficits, it is recommended that the patient have 2-3 sessions per week of Occupational Therapy at d/c to increase the patient's independence. At this time, this patient demonstrates the endurance and safety to discharge home with home vs OP services and a follow up treatment frequency of 2-3x/wk. Please see assessment section for further patient specific details. If patient discharges prior to next session this note will serve as a discharge summary. Please see below for the latest assessment towards goals. Patient Diagnosis(es): The primary encounter diagnosis was Diverticulitis of colon. Diagnoses of Enteritis, Nausea vomiting and diarrhea, Abdominal pain, unspecified abdominal location, and Ventricular bigeminy were also pertinent to this visit. Past Medical History:  has a past medical history of Cardiomyopathy ([address] and Myasthenia gravis ([facility] Past Surgical History:  has no past surgical history on file. Treatment Diagnosis: decreased ADLs and transfers secondary to diverticulitis      Assessment   Performance deficits / Impairments: Decreased functional mobility ; Decreased endurance;Decreased high-level IADLs  Assessment: Prior to admission pt was living at home alone, was independent with ADLs and IADLs. Pt now presents nearly back to her baseline, demonstrating SBA to supervision for transfers and mobility. Pt completed LB dressing with supervision. Pt plans to dc home with aides and friends to assist. Would benefit from [address] [place] [address] at [place] as well. Pt demonstrates no further acute OT needs at this time, will sign off on OT services. Treatment Diagnosis: decreased ADLs and transfers secondary to diverticulitis  Prognosis: Fair  Decision Making: Medium Complexity  REQUIRES OT FOLLOW-UP: No  Activity Tolerance  Activity Tolerance: Patient Tolerated treatment well        Plan   Occupational Therapy Plan  Times Per Week: eval and dc     Restrictions  Position Activity Restriction  Other position/activity restrictions: up with assist, contact plus    Subjective   General  Chart Reviewed: Yes  Additional Pertinent Hx: Pt admitted to ED with c/o abdominal pain and nausea/ vomiting. Pt had plans to have bowel resection in a few weeks. PMHs includes: cardiomyopathy, myasthenia gravis. Family / Caregiver Present: No  Referring Practitioner: Chris Cohen MD  Diagnosis: diverticulitis  Subjective  Subjective: Pt semi supine in bed upon arrival, agreeable to OT eval and treat.      Social/Functional History  Social/Functional History  Lives With: Alone  Type of Home: Apartment  Home Layout: One level  Home Access: Stairs to enter with rails  Entrance Stairs - Number of Steps: 5  Bathroom Shower/Tub: Walk-in shower  Bathroom Toilet: Standard (can push up from sink)  Bathroom Equipment: Shower chair  Home Equipment: [address]  Has the patient had two or more falls in the past year or any fall with injury in the past year?: No  Receives Help From: Home health, Friend(s)  ADL Assistance: Independent (showers with aide or someone close by)  [employer] Assistance: Needs assistance (aide performs laundry, cooking, cleaning (there 3x/week 4hrs/day- meal preps for other days))  Homemaking Responsibilities: No  Ambulation Assistance: Independent (rollator in community, furniture walks in apartment)  Transfer Assistance: Independent  Active : No  Patient's  Info: [employer], insurance covers 15 trips however pt goes through those fast       Objective              Safety Devices  Type of Devices: Call light within reach;Gait belt;Nurse notified; Left in chair (RN requested no chair alarm)     Toilet Transfers  Toilet - Technique: Ambulating  Equipment Used: Standard toilet  Toilet Transfer: Stand by assistance  AROM: Within functional limits  Strength: Within functional limits  ADL  Feeding: Beverage management;Setup  Grooming: Supervision  Grooming Skilled Clinical Factors: standing at sink for hand hygiene  UE Dressing: Setup  UE Dressing Skilled Clinical Factors: donning/ doffing gown  LE Dressing: Stand by assistance  LE Dressing Skilled Clinical Factors: donning pants  Toileting: Supervision  Toileting Skilled Clinical Factors: simulated as pt did not have urge to void     Activity Tolerance  Activity Tolerance: Patient tolerated evaluation without incident  Bed mobility  Supine to Sit: Supervision (Simultaneous filing. User may not have seen previous data.)  Transfers  Sit to stand: Stand by assistance  Stand to sit: Stand by assistance  Transfer Comments: Pt completed mobility in room household distance without AE- required SBA. At times pt reaching out for furniture while walking however no LOB.   Vision  Vision: Impaired  Vision Exceptions: Wears glasses at all times  Hearing  Hearing: Within functional limits  Cognition  Overall Cognitive Status: WFL  Orientation  Overall Orientation Status: Within Functional Limits  Orientation Level: Oriented X4                  Education Given To: Patient  Education Provided: Role of Therapy;Plan of Care  Education Method: Verbal  Barriers to Learning: None  Education Outcome: Verbalized understanding                 AM-PAC Score        AM-PAC Inpatient Daily Activity Raw Score: 22 (12/21/22 1023)  AM-PAC Inpatient ADL T-Scale Score : 47.1 (12/21/22 1023)  ADL Inpatient CMS 0-100% Score: 25.8 (12/21/22 1023)  ADL Inpatient CMS G-Code Modifier : Christianne Rascon (12/21/22 1023)         Goals  Patient Goals   Patient goals : to go home       Therapy Time   Individual Concurrent Group Co-treatment   Time In 0830         Time Out 0923         Minutes 53         Timed Code Treatment Minutes: 38 Minutes (+15 min eval)   Sherice ARNOLD  [address] [facility], OTR/L [id number]

## 2022-12-21 NOTE — DISCHARGE INSTRUCTIONS
Please adhere to follow up appointments and take all medications as directed in your discharge paperwork This includes taking Augmentin twice a day for 3 days to help treat your diverticulitis, and Vancomycin 4 times per day to treat your C. Diff infection.

## 2022-12-21 NOTE — PROGRESS NOTES
Physician Progress Note      PATIENT:               Pola Lopez  CSN #:                  [id number]  :                       1970  ADMIT DATE:       2022 7:58 PM  DISCH DATE:  Cole Trevizo  PROVIDER #:        Christine Ford          QUERY TEXT:    Pt admitted with diverticulitis. Pt noted to have low body temp, leukocytosis,   lactic acidosis, c-diff positive. If possible, please document in the   progress notes and discharge summary if you are evaluating and /or treating   any of the following: The medical record reflects the following:  Risk Factors: 45year old female  Clinical Indicators: 22 T 96.2, WBC 20.6, LA 4.5, c. diff +. Treatment: Labs, imaging, 1500ml NS bolus, IV Zosyn  Options provided:  -- Sepsis likely due to c. diff colitis, present on admission  -- C. diff colitis without Sepsis  -- Other - I will add my own diagnosis  -- Disagree - Not applicable / Not valid  -- Disagree - Clinically unable to determine / Unknown  -- Refer to Clinical Documentation Reviewer    PROVIDER RESPONSE TEXT:    This patient has c. diff colitis without Sepsis. Query created by:  Faby Cool on 2022 12:48 PM      Electronically signed by:  Christine Ford 2022 1:42 PM

## 2022-12-21 NOTE — PROGRESS NOTES
Progress Note    Admit Date: 12/19/2022  Day: 2  Diet: ADULT DIET; Clear Liquid    CC: Diarrhea/abd pain    Interval history: No overnight events. Patient up in chair this AM. Still having diarrhea, though nausea, vomiting, and abd pain have resolved. Okay with plan to advance diet and switch to oral abx. ROS unremarkable. All questions answered. HPI: Terrence Francis is a  45 y.o. female w PMH myasthenia gravis, stress cardiomyopathy (EF 20-25% 9/22), recurrent diverticulitis, and breast cancer s/p bilateral mastectomy (2018), who presents via ambulance to the ED with complaints of one day h/o abominal pain with vomiting and diarrhea. Patient has a history of myasthenia gravis and states initially that she thought she was getting a reaction or side effects to her medication for the myasthenia. She states that her abdomen has felt twitchy and then she developed pain, vomiting, and diarrhea. States there may have been some blood in diarrhea initially. She has no definite fever. She denies chest pain or shortness of breath. She does feel lightheaded and generally weak. 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 During her last admission with the perforated diverticulitis and stress cardiomyopathy she did have a cardiac cath which showed normal coronaries but had an ejection fraction of 20 to 25%. She was supposed to have a colonoscopy this Monday (12/26/22) to determine whether or not she would need a colonic resection. In the ED, She is diffusely tender on exam.  She was not hypotensive however at times she was quite bradycardic into the upper 20s and low 30s. This was not captured on EKG however was seen on the monitor and felt to likely represent some vagal episodes and was mostly sinus bradycardia with ectopy. Patient also was having extensive ventricular ectopy.   The ectopy did seem to calm down after she received fentanyl and Compazine and the vomiting improved. Patient was hydrated normal saline. Her initial liter was given rapidly however her second liter to complete her 30 mill per kilo bolus was slowed given her history of low ejection fraction in the past.  Chest x-ray was clear. Labs significant for leukocytosis. No SELENE. Her lactic acidosis quickly improved with IV fluid hydration. CT imaging shows enteritis and colitis versus diverticulitis but no perforation or abscess or obstruction. She was started on zosyn. Given her complex past medical history and the results and cardiomyopathy believed to be induced by an episode of diverticulitis about 30 best served by admission to the hospital.     On interview after transfer to [facility], patient feeling much better. States abdominal pain is gone, as are nausea/vomiting and diarrhea. She states her constellation of symptoms is similar to what it has been in the past. She is not having any weakness or symptoms of Myasthenic crisis. ROS is unremarkable. Plan to admit for treatment of diverticulitis.     Medications:     Scheduled Meds:   potassium bicarb-citric acid  20 mEq Oral Once    magnesium oxide  400 mg Oral BID    piperacillin-tazobactam  3,375 mg IntraVENous Q8H    metoprolol succinate  12.5 mg Oral Daily    pyridostigmine  90 mg Oral TID    [Held by provider] spironolactone  25 mg Oral Daily    sodium chloride flush  5-40 mL IntraVENous 2 times per day    enoxaparin  40 mg SubCUTAneous Daily    pantoprazole  40 mg IntraVENous Daily     Continuous Infusions:   sodium chloride       PRN Meds:sodium chloride flush, sodium chloride, ondansetron **OR** ondansetron, polyethylene glycol, acetaminophen **OR** acetaminophen    Objective:   Vitals:   T-max:  Patient Vitals for the past 8 hrs:   BP Temp Temp src Pulse Resp SpO2   12/21/22 0746 122/79 97.2 °F (36.2 °C) Oral 69 16 98 %   12/21/22 0300 121/72 97.1 °F (36.2 °C) Oral 70 16 99 %       Intake/Output Summary (Last 24 hours) at 12/21/2022 9766  Last data filed at 12/21/2022 0906  Gross per 24 hour   Intake 600 ml   Output 300 ml   Net 300 ml       Review of Systems  ROS: A 10 point review of systems was conducted, significant findings as noted in HPI. Physical Exam  Physical Exam  Constitutional:       General: She is not in acute distress. Appearance: She is obese. HENT:      Head: Normocephalic and atraumatic. Right Ear: External ear normal.      Left Ear: External ear normal.   Eyes:      Extraocular Movements: Extraocular movements intact. Conjunctiva/sclera: Conjunctivae normal.   Cardiovascular:      Rate and Rhythm: Normal rate and regular rhythm. Pulses: Normal pulses. Heart sounds: Normal heart sounds. Pulmonary:      Effort: Pulmonary effort is normal.      Breath sounds: Normal breath sounds. Abdominal:      General: Abdomen is flat. Palpations: Abdomen is soft. Tenderness: There is no abdominal tenderness. There is no guarding. Musculoskeletal:      Right lower leg: No edema. Left lower leg: No edema. Skin:     General: Skin is warm and dry. Neurological:      General: No focal deficit present. Mental Status: She is alert and oriented to person, place, and time. Mental status is at baseline.    Psychiatric:         Mood and Affect: Mood normal.         Behavior: Behavior normal.     LABS:    CBC:   Recent Labs     12/19/22 2036 12/20/22  1555 12/21/22  0719   WBC 20.6* 6.8 5.2   HGB 13.5 11.7* 12.2   HCT 40.0 33.4* 35.4*    168 153   MCV 90.2 89.9 89.8     Renal:    Recent Labs     12/19/22 2036 12/20/22  1555 12/21/22  0719    142 140   K 3.6 3.4* 3.4*    107 105   CO2 20* 26 23   BUN 10 6* 4*   CREATININE <0.5* <0.5* <0.5*   GLUCOSE 238* 86 77   CALCIUM 10.2 9.0 8.9   MG 1.90 1.70* 1.70*   ANIONGAP 18* 9 12     Hepatic:   Recent Labs     12/19/22 2036   AST 17   ALT 11   BILITOT 0.3   PROT 7.9   LABALBU 4.8   ALKPHOS 85     Troponin:   Recent Labs 12/19/22 2036   TROPONINI <0.01     BNP: No results for input(s): BNP in the last 72 hours. Lipids: No results for input(s): CHOL, HDL in the last 72 hours. Invalid input(s): LDLCALCU, TRIGLYCERIDE  ABGs:  No results for input(s): PHART, NCN2IFZ, PO2ART, KMJ5YHL, BEART, THGBART, V3RHEDKR, GRG5RSV in the last 72 hours. INR: No results for input(s): INR in the last 72 hours. Lactate: No results for input(s): LACTATE in the last 72 hours. Cultures:  -----------------------------------------------------------------  RAD:   CT ABDOMEN PELVIS W IV CONTRAST Additional Contrast? None   Final Result      1. Moderate sigmoid colitis versus diverticulitis without evidence of acute perforation or abscess. 2.  Moderate jejunal enteritis. XR CHEST PORTABLE   Final Result      No acute pulmonary disease. Assessment/Plan:   Leidy Martin is a  45 y.o. female w PMH myasthenia gravis, stress cardiomyopathy (EF 20-25% 9/22), recurrent diverticulitis, and breast cancer s/p bilateral mastectomy (2018), who presents via ambulance to the ED with complaints of one day h/o abominal pain with vomiting and diarrhea. #Diverticulitis  #Leukocytosis  Patient with hx of recurrent diverticulitis (6 episodes per pt), in the past c/b perforation and stress-induced cardiomyopathy. Now presenting with day of n/v/d, leukocytosis to 20.6. HDS and afebrile. CT abdomen w moderate sigmoid colitis versus diverticulitis without evidence of acute perforation or abscess. Moderate jejunal enteritis. - Continue zosyn, will transition to augmentin  - IV hydration  - Zofran prn  - Advanced diet from CLD to regular  - Daily CBC, BMP  - C Diff indeterminant  - Blood cultures no growth to date     #Hyperglycemia  Glucose of 238 in ED, now wnl. No hx of A1c in chart  - Trend glucose in daily BMP  - Check A1c     #Lactic acidemia - resolved  LA 4.5 --> 1.7 s/p fluid bolus. Bicarb 20. Likely 2/2 diverticulitis.   - Cont IV rehydration    #Hypokalemia/magnesemia  - Replace prn  - Hx Myasthenia Gravis, replace Mg orally, IV contraindicated     Chronic conditions  #Myasthenia Gravis - no signs/symptoms of crisis, continue home pyridostigmine  #Stress-induced cardiomyopathy (HFrEF) - EF 20-25% 9/22, pro- in ED, no signs or symptoms of acute exacerbation, continue home aldactone, metoprolol  #Breast ca s/p bilateral mastectomy (2018) - no intervention at this time     Will discuss with attending physician Dr. Raine Corado     Code Status: Full code  FEN: Regular  PPX: Lovenox  DISPO: Gustavo Canales MD, PGY-1  12/21/22  9:33 AM    This patient has been staffed and discussed with Raine Corado MD.

## 2022-12-21 NOTE — PROGRESS NOTES
Comprehensive Nutrition Assessment    RECOMMENDATIONS:  PO Diet: Continue regular lactose controlled diet  ONS: N/A  Nutrition Education: Education not indicated     NUTRITION ASSESSMENT:   Nutritional summary & status: MST 2. Pt was seen in room. She reports that her poor appetite due to abdominal pain has been going on for over a year. There is no weight loss documented and pt cannot confirm any amount of weight loss. Pt's diet advanced from clear liquids which was toelrated well. Pt is now on regular, lactose free diet. She says she was tolerating her first meal well but notes that due to medical condition, she has difficulty cutting up food. Will recommend texture modification. No nutrition intervention needed at this time as appetite is returning but continue to monitor intakes. Admission/PMH: Hx of myasthenia gravis, stress cardiomyopathy (EF 20-25% 9/22), recurrent diverticulitis, and breast cancer s/p bilateral mastectomy (2018). Presents via ambulance to the ED with complaints of one day h/o abominal pain with vomiting and diarrhea. MALNUTRITION ASSESSMENT  Context of Malnutrition: Chronic Illness   Malnutrition Status:  At risk for malnutrition (Comment)  Findings of the 6 clinical characteristics of malnutrition (Minimum of 2 out of 6 clinical characteristics is required to make the diagnosis of moderate or severe Protein Calorie Malnutrition based on AND/ASPEN Guidelines):  Energy Intake:  75% or less estimated energy requirements for 1 month or longer  Weight Loss:  Mild weight loss (specify amount and time period)     Body Fat Loss:  No significant body fat loss     Muscle Mass Loss:  No significant muscle mass loss    Fluid Accumulation:  No significant fluid accumulation     Strength:  Not Performed    NUTRITION DIAGNOSIS   Predicted inadequate energy intake related to pain, early satiety as evidenced by poor intake prior to admission    Nutrition Monitoring and Evaluation:   Food/Nutrient Intake Outcomes:  Food and Nutrient Intake  Physical Signs/Symptoms Outcomes:  Biochemical Data, Nutrition Focused Physical Findings, Weight, GI Status     OBJECTIVE DATA: Significant to nutrition assessment  Nutrition Related Findings: no edema, K 3.4, Mg 1.7  Wounds: None  Nutrition Goals: Meet at least 75% of estimated needs, by next RD assessment     CURRENT NUTRITION THERAPIES  ADULT DIET; Regular; Lactose-Controlled  PO Intake: Unable to assess   PO Supplement Intake:None Ordered  Additional Sources of Calories/IVF:      ANTHROPOMETRICS  Current Height: 5' 10\" (177.8 cm)  Current Weight: 140 lb (63.5 kg)    Admission weight: 140 lb (63.5 kg)  Ideal Body Weight (IBW): 150 lbs  (68 kg)    Usual Bodyweight     BMI: 20.1    COMPARATIVE STANDARDS  Energy (kcal):  1588 - 1905 (25-30 kcal/kg)     Protein (g):  64 - 76 (1-1.2 g/kg)       Fluid (mL/day):  1588 - 1905 (1ml/kcal) or per provider    The patient will be monitored per nutrition standards of care. Consult dietitian if additional nutrition interventions are needed prior to RD reassessment.      Elsy Alvarez, [facility]:  [phone]  Office:  [phone]

## 2022-12-21 NOTE — DISCHARGE SUMMARY
Discharge Summary    Date: 12/21/2022  Patient Name: Amos Nair    YOB: 1970     Age: 45 y.o. Admit Date: 12/19/2022  Discharge Date: 12/21/2022  Discharge Condition: Stable    Admission Diagnosis  Diverticulitis [K57.92]; Enteritis [K52.9]; Diverticulitis of colon [K57.32]; Ventricular bigeminy [I49.8]; Nausea vomiting and diarrhea [R11.2, R19.7]; Continuous severe abdominal pain [R10.9]; Abdominal pain, unspecified abdominal location [R10.9]      Discharge Diagnosis  Principal Problem:    Continuous severe abdominal pain  Active Problems:    Diverticulitis  Resolved Problems:    * No resolved hospital problems. [facility] AND [facility] Stay  Narrative of Hospital Course:  Amos Nair is a  45 y.o. female w PMH myasthenia gravis, stress cardiomyopathy (EF 20-25% 9/22), recurrent diverticulitis, and breast cancer s/p bilateral mastectomy (2018), who presents via ambulance to the ED with complaints of one day h/o abominal pain with vomiting and diarrhea. Patient has a history of myasthenia gravis and states initially that she thought she was getting a reaction or side effects to her medication for the myasthenia. She states that her abdomen has felt twitchy and then she developed pain, vomiting, and diarrhea. States there may have been some blood in diarrhea initially. She has no definite fever. She denies chest pain or shortness of breath. She does feel lightheaded and generally weak. 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 During her last admission with the perforated diverticulitis and stress cardiomyopathy she did have a cardiac cath which showed normal coronaries but had an ejection fraction of 20 to 25%. She was supposed to have a colonoscopy this Monday (12/26/22) to determine whether or not she would need a colonic resection.      In the ED, She is diffusely tender on exam.  She was not hypotensive however at times she was quite bradycardic into the upper 20s and low 30s. This was not captured on EKG however was seen on the monitor and felt to likely represent some vagal episodes and was mostly sinus bradycardia with ectopy. Patient also was having extensive ventricular ectopy. The ectopy did seem to calm down after she received fentanyl and Compazine and the vomiting improved. Patient was hydrated normal saline. Her initial liter was given rapidly however her second liter to complete her 30 mill per kilo bolus was slowed given her history of low ejection fraction in the past.  Chest x-ray was clear. Labs significant for leukocytosis. No SELENE. Her lactic acidosis quickly improved with IV fluid hydration. CT imaging shows enteritis and colitis versus diverticulitis but no perforation or abscess or obstruction. She was started on zosyn. Given her complex past medical history and the results and cardiomyopathy believed to be induced by an episode of diverticulitis about 30 best served by admission to the hospital.     On interview after transfer to [facility], patient feeling much better. States abdominal pain is gone, as are nausea/vomiting and diarrhea. She states her constellation of symptoms is similar to what it has been in the past. She is not having any weakness or symptoms of Myasthenic crisis. ROS is unremarkable. Plan to admit for treatment of diverticulitis. Brief hospital course: Patient admitted and treated for diverticulitis, incidentally tested positive and was treated for C Diff. Diverticulitis was treated w Zosyn which was transitioned to Augmentin. C Diff treated w PO Vanc. Patient's nausea and vomiting continued to remain resolved though patient continued to experience diarrhea. On discharge patient was stable, instructed to f/u w UC surgeon for flex sig and likely colon resection. She was instructed to take Augmentin and PO Vanc as prescribed. Also instructed to f/u w PCP.  Additional details below:  #Diverticulitis  #Leukocytosis  Patient with hx of recurrent diverticulitis (6 episodes per pt), in the past c/b perforation and stress-induced cardiomyopathy. Now presenting with day of n/v/d, leukocytosis to 20.6. HDS and afebrile. CT abdomen w moderate sigmoid colitis versus diverticulitis without evidence of acute perforation or abscess. Moderate jejunal enteritis. - Zosyn, transitioned to augmentin  - IV hydration  - Zofran prn  - Advanced diet from CLD to regular, dairy free  - Daily CBC, BMP  - C Diff positive, started oral vanc 125 mg q6h  - Blood cultures no growth to date     #Hyperglycemia  Glucose of 238 in ED, now wnl. No hx of A1c in chart  - Trend glucose in daily BMP  - A1c 5.4     #Lactic acidemia - resolved  LA 4.5 --> 1.7 s/p fluid bolus. Bicarb 20. Likely 2/2 diverticulitis. - Cont IV rehydration     #Hypokalemia/magnesemia  - Replace prn  - Hx Myasthenia Gravis, replace Mg orally, IV MG sulfate contraindicated     Chronic conditions  #Myasthenia Gravis - no signs/symptoms of crisis, continue home pyridostigmine  #Stress-induced cardiomyopathy (HFrEF) - EF 20-25% 9/22, pro- in ED, no signs or symptoms of acute exacerbation, continue home aldactone, metoprolol  #Breast ca s/p bilateral mastectomy (2018) - no intervention at this time      Consultants:  None    Surgeries/procedures Performed:      Treatments:    Antibiotics, IV Hydration and Therapies    Zosyn and Other, PT and OT    Discharge Plan/Disposition:  Home    Hospital/Incidental Findings Requiring Follow Up:    Patient Instructions:    Diet: Regular Diet    Activity:Activity as Tolerated  For number of days (if applicable): Other Instructions:    Provider Follow-Up:   No follow-ups on file.      Significant Diagnostic Studies:    Recent Labs:  Admission on 12/19/2022  WBC                                           Date: 12/19/2022  Value: 20.6 (A)    Ref range: 4.0 - 11.0 K/uL    Status: Final  RBC Date: 12/19/2022  Value: 4.44        Ref range: 4.00 - 5.20 M/uL   Status: Final  Hemoglobin                                    Date: 12/19/2022  Value: 13.5        Ref range: 12.0 - 16.0 g/dL   Status: Final  Hematocrit                                    Date: 12/19/2022  Value: 40.0        Ref range: 36.0 - 48.0 %      Status: Final  MCV                                           Date: 12/19/2022  Value: 90.2        Ref range: 80.0 - 100.0 fL    Status: Final  MCH                                           Date: 12/19/2022  Value: 30.4        Ref range: 26.0 - 34.0 pg     Status: Final  MCHC                                          Date: 12/19/2022  Value: 33.7        Ref range: 31.0 - 36.0 g/dL   Status: Final  RDW                                           Date: 12/19/2022  Value: 13.4        Ref range: 12.4 - 15.4 %      Status: Final  Platelets                                     Date: 12/19/2022  Value: 257         Ref range: 135 - 450 K/uL     Status: Final  MPV                                           Date: 12/19/2022  Value: 9.3         Ref range: 5.0 - 10.5 fL      Status: Final  Neutrophils %                                 Date: 12/19/2022  Value: 83.8        Ref range: %                  Status: Final  Lymphocytes %                                 Date: 12/19/2022  Value: 11.4        Ref range: %                  Status: Final  Monocytes %                                   Date: 12/19/2022  Value: 4.0         Ref range: %                  Status: Final  Eosinophils %                                 Date: 12/19/2022  Value: 0.4         Ref range: %                  Status: Final  Basophils %                                   Date: 12/19/2022  Value: 0.4         Ref range: %                  Status: Final  Neutrophils Absolute                          Date: 12/19/2022  Value: 17.3 (A)    Ref range: 1.7 - 7.7 K/uL     Status: Final  Lymphocytes Absolute                          Date: 12/19/2022  Value: 2.3         Ref range: 1.0 - 5.1 K/uL     Status: Final  Monocytes Absolute                            Date: 12/19/2022  Value: 0.8         Ref range: 0.0 - 1.3 K/uL     Status: Final  Eosinophils Absolute                          Date: 12/19/2022  Value: 0.1         Ref range: 0.0 - 0.6 K/uL     Status: Final  Basophils Absolute                            Date: 12/19/2022  Value: 0.1         Ref range: 0.0 - 0.2 K/uL     Status: Final  Sodium                                        Date: 12/19/2022  Value: 143         Ref range: 136 - 145 mmol/L   Status: Final  Potassium                                     Date: 12/19/2022  Value: 3.6         Ref range: 3.5 - 5.1 mmol/L   Status: Final  Chloride                                      Date: 12/19/2022  Value: 105         Ref range: 99 - 110 mmol/L    Status: Final  CO2                                           Date: 12/19/2022  Value: 20 (A)      Ref range: 21 - 32 mmol/L     Status: Final  Anion Gap                                     Date: 12/19/2022  Value: 18 (A)      Ref range: 3 - 16             Status: Final  Glucose                                       Date: 12/19/2022  Value: 238 (A)     Ref range: 70 - 99 mg/dL      Status: Final  BUN                                           Date: 12/19/2022  Value: 10          Ref range: 7 - 20 mg/dL       Status: Final  Creatinine                                    Date: 12/19/2022  Value: <0.5 (A)    Ref range: 0.6 - 1.1 mg/dL    Status: Final  Est, Glom Filt Rate                           Date: 12/19/2022  Value: >60         Ref range: >60                Status: Final                Comment: Pediatric calculator link  [email or web address]. [email or web address]  Effective Oct 3, 2022  These results are not intended for use in patients  <25years of age. eGFR results are calculated without  a race factor using the 2021 CKD-EPI equation.   Careful  clinical correlation is recommended, particularly when  comparing to results calculated using previous equations. The CKD-EPI equation is less accurate in patients with  extremes of muscle mass, extra-renal metabolism of  creatinine, excessive creatinine ingestion, or following  therapy that affects renal tubular secretion.     Calcium                                       Date: 12/19/2022  Value: 10.2        Ref range: 8.3 - 10.6 mg/dL   Status: Final  Total Protein                                 Date: 12/19/2022  Value: 7.9         Ref range: 6.4 - 8.2 g/dL     Status: Final  Albumin                                       Date: 12/19/2022  Value: 4.8         Ref range: 3.4 - 5.0 g/dL     Status: Final  Albumin/Globulin Ratio                        Date: 12/19/2022  Value: 1.5         Ref range: 1.1 - 2.2          Status: Final  Total Bilirubin                               Date: 12/19/2022  Value: 0.3         Ref range: 0.0 - 1.0 mg/dL    Status: Final  Alkaline Phosphatase                          Date: 12/19/2022  Value: 85          Ref range: 40 - 129 U/L       Status: Final  ALT                                           Date: 12/19/2022  Value: 11          Ref range: 10 - 40 U/L        Status: Final  AST                                           Date: 12/19/2022  Value: 17          Ref range: 15 - 37 U/L        Status: Final  Magnesium                                     Date: 12/19/2022  Value: 1.90        Ref range: 1.80 - 2.40 mg/dL  Status: Final  Troponin                                      Date: 12/19/2022  Value: <0.01       Ref range: <0.01 ng/mL        Status: Final                Comment: Methodology by Troponin T  Lactic Acid                                   Date: 12/19/2022  Value: 4.5 (A)     Ref range: 0.4 - 2.0 mmol/L   Status: Final  pH, Erick                                       Date: 12/19/2022  Value: 7.386       Ref range: 7.350 - 7.450      Status: Final  pCO2, Erick                                     Date: 12/19/2022  Value: 35.2 (A)    Ref range: 40.0 - 50.0 mmHg   Status: Final  pO2, Erick                                      Date: 12/19/2022  Value: 170.7 (A)   Ref range: 25.0 - 40.0 mmHg   Status: Final  HCO3, Venous                                  Date: 12/19/2022  Value: 21.1 (A)    Ref range: 23.0 - 29.0 mmol*  Status: Final  Base Excess, Erick                              Date: 12/19/2022  Value: -3.9 (A)    Ref range: -3.0 - 3.0 mmol/L  Status: Final  O2 Sat, Erick                                   Date: 12/19/2022  Value: 99          Ref range: Not Established %  Status: Final  TC02 (Calc), Erick                              Date: 12/19/2022  Value: 20          Ref range: Not Established *  Status: Final  O2 Therapy                                    Date: 12/19/2022  Value: Unknown       Status: Final  Ventricular Rate                              Date: 12/19/2022  Value: 58          Ref range: BPM                Status: Final  Atrial Rate                                   Date: 12/19/2022  Value: 49          Ref range: BPM                Status: Final  QRS Duration                                  Date: 12/19/2022  Value: 88          Ref range: ms                 Status: Final  Q-T Interval                                  Date: 12/19/2022  Value: 476         Ref range: ms                 Status: Final  QTc Calculation (Bazett)                      Date: 12/19/2022  Value: 467         Ref range: ms                 Status: Final  R Axis                                        Date: 12/19/2022  Value: 65          Ref range: degrees            Status: Final  T Axis                                        Date: 12/19/2022  Value: 90          Ref range: degrees            Status: Final  Diagnosis                                     Date: 12/19/2022  Value:  Poor data quality, interpretation may be adve*                       Status: Final  Ventricular Rate                              Date: 12/19/2022  Value: 59          Ref range: BPM Status: Final  Atrial Rate                                   Date: 12/19/2022  Value: 59          Ref range: BPM                Status: Final  P-R Interval                                  Date: 12/19/2022  Value: 164         Ref range: ms                 Status: Final  QRS Duration                                  Date: 12/19/2022  Value: 96          Ref range: ms                 Status: Final  Q-T Interval                                  Date: 12/19/2022  Value: 446         Ref range: ms                 Status: Final  QTc Calculation (Bazett)                      Date: 12/19/2022  Value: 441         Ref range: ms                 Status: Final  P Axis                                        Date: 12/19/2022  Value: 64          Ref range: degrees            Status: Final  R Axis                                        Date: 12/19/2022  Value: 58          Ref range: degrees            Status: Final  T Axis                                        Date: 12/19/2022  Value: 87          Ref range: degrees            Status: Final  Diagnosis                                     Date: 12/19/2022  Value:  Poor data quality, interpretation may be adve*                       Status: Final  Pro-BNP                                       Date: 12/19/2022  Value: 386 (A)     Ref range: 0 - 124 pg/mL      Status: Final                Comment: Methodology by NT-proBNP    An age-independent cutoff point of 300 pg/ml has a 98%  negative predictive value excluding acute heart failure. Values exceeding the age-related cutoff values (450 pg/mL if  age<50, 900 if 50-75 and 1800 if >75) has 90% sensitivity and  84% specificity for diagnosing acute HF. In patients with  renal compromise (eGFR<60) values greater than 1200pg/ml have  a diagnostic sensitivity and specificity of 89% and 72% for  acute HF.     aPTT                                          Date: 12/19/2022  Value: 29.4        Ref range: 23.0 - 34.3 sec    Status: Final Comment: Therapeutic range: 73.0 - 102.0 sec    Effective 5-25-22 9:00am EST  Please note reference ranges have  changed for PTT Testing.     Color, UA                                     Date: 12/20/2022  Value: Yellow      Ref range: Straw/Yellow       Status: Final  Clarity, UA                                   Date: 12/20/2022  Value: Clear       Ref range: Clear              Status: Final  Glucose, Ur                                   Date: 12/20/2022  Value: Negative    Ref range: Negative mg/dL     Status: Final  Bilirubin Urine                               Date: 12/20/2022  Value: Negative    Ref range: Negative           Status: Final  Ketones, Urine                                Date: 12/20/2022  Value: Negative    Ref range: Negative mg/dL     Status: Final  Specific [place], UA                          Date: 12/20/2022  Value: <=1.005     Ref range: 1.005 - 1.030      Status: Final  Blood, Urine                                  Date: 12/20/2022  Value: MODERATE (A) Ref range: Negative           Status: Final  pH, UA                                        Date: 12/20/2022  Value: 5.0         Ref range: 5.0 - 8.0          Status: Final  Protein, UA                                   Date: 12/20/2022  Value: Negative    Ref range: Negative mg/dL     Status: Final  Urobilinogen, Urine                           Date: 12/20/2022  Value: 0.2         Ref range: <2.0 E.U./dL       Status: Final  Nitrite, Urine                                Date: 12/20/2022  Value: POSITIVE (A) Ref range: Negative           Status: Final  Leukocyte Esterase, Urine                     Date: 12/20/2022  Value: Negative    Ref range: Negative           Status: Final  Microscopic Examination                       Date: 12/20/2022  Value: YES           Status: Final  Urine Type                                    Date: 12/20/2022  Value: NotGiven      Status: Final                Comment: Urine received in a container without preservatives. Urine Reflex to Culture                       Date: 12/20/2022  Value: Yes           Status: Final  Blood Culture, Routine                        Date: 12/19/2022  Value: No Growth to date. Any change in status will be *                       Status: Preliminary  Culture, Blood 2                              Date: 12/19/2022  Value: No Growth to date. Any change in status will be *                       Status: Preliminary  Lactic Acid                                   Date: 12/19/2022  Value: 1.7         Ref range: 0.4 - 2.0 mmol/L   Status: Final  Hyaline Casts, UA                             Date: 12/20/2022  Value: 3-5 (A)     Ref range: 0 - 2 /LPF         Status: Final  Mucus, UA                                     Date: 12/20/2022  Value: 3+ (A)      Ref range: None Seen /LPF     Status: Final  WBC, UA                                       Date: 12/20/2022  Value: 21-50 (A)   Ref range: 0 - 5 /HPF         Status: Final                Comment: WBC clumps present  RBC, UA                                       Date: 12/20/2022  Value: 5-10 (A)    Ref range: 0 - 4 /HPF         Status: Final  Epithelial Cells, UA                          Date: 12/20/2022  Value: 11-20 (A)   Ref range: 0 - 5 /HPF         Status: Final  Bacteria, UA                                  Date: 12/20/2022  Value: 3+ (A)      Ref range: None Seen /HPF     Status: Final  Amorphous, UA                                 Date: 12/20/2022  Value: 2+          Ref range: /HPF               Status: Final  Urine Culture, Routine                        Date: 12/19/2022  Value: No growth at 18 to 36 hours                       Status: Final  C.diff Toxin/Antigen                          Date: 12/20/2022  Value:               Status: Final                   Value: Indeterminate see results of C. difficile amplification(PCR)  Normal Range: Negative    Sodium                                        Date: 12/20/2022  Value: 142 Ref range: 136 - 145 mmol/L   Status: Final  Potassium reflex Magnesium                    Date: 12/20/2022  Value: 3.4 (A)     Ref range: 3.5 - 5.1 mmol/L   Status: Final  Chloride                                      Date: 12/20/2022  Value: 107         Ref range: 99 - 110 mmol/L    Status: Final  CO2                                           Date: 12/20/2022  Value: 26          Ref range: 21 - 32 mmol/L     Status: Final  Anion Gap                                     Date: 12/20/2022  Value: 9           Ref range: 3 - 16             Status: Final  Glucose                                       Date: 12/20/2022  Value: 86          Ref range: 70 - 99 mg/dL      Status: Final  BUN                                           Date: 12/20/2022  Value: 6 (A)       Ref range: 7 - 20 mg/dL       Status: Final  Creatinine                                    Date: 12/20/2022  Value: <0.5 (A)    Ref range: 0.6 - 1.1 mg/dL    Status: Final  Est, Glom Filt Rate                           Date: 12/20/2022  Value: >60         Ref range: >60                Status: Final                Comment: Pediatric calculator link  [email or web address]  Effective Oct 3, 2022  These results are not intended for use in patients  <25years of age. eGFR results are calculated without  a race factor using the 2021 CKD-EPI equation. Careful  clinical correlation is recommended, particularly when  comparing to results calculated using previous equations. The CKD-EPI equation is less accurate in patients with  extremes of muscle mass, extra-renal metabolism of  creatinine, excessive creatinine ingestion, or following  therapy that affects renal tubular secretion.     Calcium                                       Date: 12/20/2022  Value: 9.0         Ref range: 8.3 - 10.6 mg/dL   Status: Final  WBC                                           Date: 12/20/2022  Value: 6.8         Ref range: 4.0 - 11.0 K/uL    Status: Final  RBC                                           Date: 12/20/2022  Value: 3.71 (A)    Ref range: 4.00 - 5.20 M/uL   Status: Final  Hemoglobin                                    Date: 12/20/2022  Value: 11.7 (A)    Ref range: 12.0 - 16.0 g/dL   Status: Final  Hematocrit                                    Date: 12/20/2022  Value: 33.4 (A)    Ref range: 36.0 - 48.0 %      Status: Final  MCV                                           Date: 12/20/2022  Value: 89.9        Ref range: 80.0 - 100.0 fL    Status: Final  MCH                                           Date: 12/20/2022  Value: 31.5        Ref range: 26.0 - 34.0 pg     Status: Final  MCHC                                          Date: 12/20/2022  Value: 35.0        Ref range: 31.0 - 36.0 g/dL   Status: Final  RDW                                           Date: 12/20/2022  Value: 13.1        Ref range: 12.4 - 15.4 %      Status: Final  Platelets                                     Date: 12/20/2022  Value: 168         Ref range: 135 - 450 K/uL     Status: Final  MPV                                           Date: 12/20/2022  Value: 8.9         Ref range: 5.0 - 10.5 fL      Status: Final  Neutrophils %                                 Date: 12/20/2022  Value: 53.0        Ref range: %                  Status: Final  Lymphocytes %                                 Date: 12/20/2022  Value: 37.9        Ref range: %                  Status: Final  Monocytes %                                   Date: 12/20/2022  Value: 7.6         Ref range: %                  Status: Final  Eosinophils %                                 Date: 12/20/2022  Value: 1.0         Ref range: %                  Status: Final  Basophils %                                   Date: 12/20/2022  Value: 0.5         Ref range: %                  Status: Final  Neutrophils Absolute                          Date: 12/20/2022  Value: 3.6         Ref range: 1.7 - 7.7 K/uL     Status: Final  Lymphocytes Absolute Date: 12/20/2022  Value: 2.6         Ref range: 1.0 - 5.1 K/uL     Status: Final  Monocytes Absolute                            Date: 12/20/2022  Value: 0.5         Ref range: 0.0 - 1.3 K/uL     Status: Final  Eosinophils Absolute                          Date: 12/20/2022  Value: 0.1         Ref range: 0.0 - 0.6 K/uL     Status: Final  Basophils Absolute                            Date: 12/20/2022  Value: 0.0         Ref range: 0.0 - 0.2 K/uL     Status: Final  Hemoglobin A1C                                Date: 12/20/2022  Value: 5.4         Ref range: See comment %      Status: Final                Comment: Comment:  Diagnosis of Diabetes: > or = 6.5%  Increased risk of diabetes (Prediabetes): 5.7-6.4%  Glycemic Control: Nonpregnant Adults: <7.0%                    Pregnant: <6.0%      eAG                                           Date: 12/20/2022  Value: 108. 3       Ref range: mg/dL              Status: Final  Magnesium                                     Date: 12/20/2022  Value: 1.70 (A)    Ref range: 1.80 - 2.40 mg/dL  Status: Final  WBC                                           Date: 12/21/2022  Value: 5.2         Ref range: 4.0 - 11.0 K/uL    Status: Final  RBC                                           Date: 12/21/2022  Value: 3.95 (A)    Ref range: 4.00 - 5.20 M/uL   Status: Final  Hemoglobin                                    Date: 12/21/2022  Value: 12.2        Ref range: 12.0 - 16.0 g/dL   Status: Final  Hematocrit                                    Date: 12/21/2022  Value: 35.4 (A)    Ref range: 36.0 - 48.0 %      Status: Final  MCV                                           Date: 12/21/2022  Value: 89.8        Ref range: 80.0 - 100.0 fL    Status: Final  MCH                                           Date: 12/21/2022  Value: 30.9        Ref range: 26.0 - 34.0 pg     Status: Final  MCHC                                          Date: 12/21/2022  Value: 34.4        Ref range: 31.0 - 36.0 g/dL   Status: Final  RDW                                           Date: 12/21/2022  Value: 13.3        Ref range: 12.4 - 15.4 %      Status: Final  Platelets                                     Date: 12/21/2022  Value: 153         Ref range: 135 - 450 K/uL     Status: Final  MPV                                           Date: 12/21/2022  Value: 9.3         Ref range: 5.0 - 10.5 fL      Status: Final  Neutrophils %                                 Date: 12/21/2022  Value: 53.4        Ref range: %                  Status: Final  Lymphocytes %                                 Date: 12/21/2022  Value: 38.1        Ref range: %                  Status: Final  Monocytes %                                   Date: 12/21/2022  Value: 6.0         Ref range: %                  Status: Final  Eosinophils %                                 Date: 12/21/2022  Value: 1.9         Ref range: %                  Status: Final  Basophils %                                   Date: 12/21/2022  Value: 0.6         Ref range: %                  Status: Final  Neutrophils Absolute                          Date: 12/21/2022  Value: 2.8         Ref range: 1.7 - 7.7 K/uL     Status: Final  Lymphocytes Absolute                          Date: 12/21/2022  Value: 2.0         Ref range: 1.0 - 5.1 K/uL     Status: Final  Monocytes Absolute                            Date: 12/21/2022  Value: 0.3         Ref range: 0.0 - 1.3 K/uL     Status: Final  Eosinophils Absolute                          Date: 12/21/2022  Value: 0.1         Ref range: 0.0 - 0.6 K/uL     Status: Final  Basophils Absolute                            Date: 12/21/2022  Value: 0.0         Ref range: 0.0 - 0.2 K/uL     Status: Final  Sodium                                        Date: 12/21/2022  Value: 140         Ref range: 136 - 145 mmol/L   Status: Final  Potassium                                     Date: 12/21/2022  Value: 3.4 (A)     Ref range: 3.5 - 5.1 mmol/L   Status: Final  Chloride                                      Date: 12/21/2022  Value: 105         Ref range: 99 - 110 mmol/L    Status: Final  CO2                                           Date: 12/21/2022  Value: 23          Ref range: 21 - 32 mmol/L     Status: Final  Anion Gap                                     Date: 12/21/2022  Value: 12          Ref range: 3 - 16             Status: Final  Glucose                                       Date: 12/21/2022  Value: 77          Ref range: 70 - 99 mg/dL      Status: Final  BUN                                           Date: 12/21/2022  Value: 4 (A)       Ref range: 7 - 20 mg/dL       Status: Final  Creatinine                                    Date: 12/21/2022  Value: <0.5 (A)    Ref range: 0.6 - 1.1 mg/dL    Status: Final  Est, Glom Filt Rate                           Date: 12/21/2022  Value: >60         Ref range: >60                Status: Final                Comment: Pediatric calculator link  [email or web address]. org/professionals/kdoqi/gfr_[email or web address]  Effective Oct 3, 2022  These results are not intended for use in patients  <25years of age. eGFR results are calculated without  a race factor using the 2021 CKD-EPI equation. Careful  clinical correlation is recommended, particularly when  comparing to results calculated using previous equations. The CKD-EPI equation is less accurate in patients with  extremes of muscle mass, extra-renal metabolism of  creatinine, excessive creatinine ingestion, or following  therapy that affects renal tubular secretion.     Calcium                                       Date: 12/21/2022  Value: 8.9         Ref range: 8.3 - 10.6 mg/dL   Status: Final  Magnesium                                     Date: 12/21/2022  Value: 1.70 (A)    Ref range: 1.80 - 2.40 mg/dL  Status: Final  Organism                                      Date: 12/20/2022  Value: C. difficile toxin B gene detected                       Status: Final  C. difficile toxin Molecular                  Date: 12/20/2022  Value:   (A)         Status: Final                   Value:POSITIVE FOR  Normal Range: Not detected  CONTACT PRECAUTIONS INDICATED    ------------    Radiology last 7 days:  CT ABDOMEN PELVIS W IV CONTRAST Additional Contrast? None    Result Date: 12/19/2022  1. Moderate sigmoid colitis versus diverticulitis without evidence of acute perforation or abscess. 2.  Moderate jejunal enteritis. XR CHEST PORTABLE    Result Date: 12/19/2022  No acute pulmonary disease.         Pending Labs     Order Current Status    Culture, Blood 1 Preliminary result    Culture, Blood 2 Preliminary result        Discharge Medications    Current Discharge Medication List    START taking these medications    vancomycin (VANCOCIN) 50 mg/mL oral solution  Take 2.5 mLs by mouth 4 times daily for 10 days  Qty: 100 mL Refills: 0    amoxicillin-clavulanate (AUGMENTIN) 875-125 MG per tablet  Take 1 tablet by mouth every 12 hours for 8 doses  Qty: 8 tablet Refills: 0    Potassium-bicarbonate (EFFER-K) 25 mEq per tablet  Take 1 tablet by mouth daily for 5 days  Qty: 5 tablets Refills: 0    Current Discharge Medication List    Current Discharge Medication List    CONTINUE these medications which have NOT CHANGED    cyanocobalamin 1000 MCG/ML injection  Inject 1,000 mcg into the muscle Every 28 days    vitamin D (CHOLECALCIFEROL) 25 MCG (1000 UT) TABS tablet  Take 1,000 Units by mouth daily    metoprolol succinate (TOPROL XL) 25 MG extended release tablet  Take 12.5 mg by mouth daily    pantoprazole (PROTONIX) 40 MG tablet  TAKE 1 TABLET BY MOUTH TWICE A DAY    spironolactone (ALDACTONE) 25 MG tablet  Take 25 mg by mouth daily    pyridostigmine (MESTINON) 60 MG tablet  90 mg 3 times daily    albuterol sulfate HFA (PROVENTIL;VENTOLIN;PROAIR) 108 (90 Base) MCG/ACT inhaler  Inhale 2 puffs into the lungs every 6 hours as needed    Current Discharge Medication List    Time Spent on Discharge:  30 minutes were spent in patient examination, evaluation, counseling as well as medication reconciliation, prescriptions for required medications, discharge plan, and follow up.     Electronically signed by Mayelin Wood MD on 12/21/22 at 2:08 PM EST

## 2022-12-22 VITALS
SYSTOLIC BLOOD PRESSURE: 120 MMHG | HEIGHT: 70 IN | TEMPERATURE: 97.4 F | HEART RATE: 59 BPM | OXYGEN SATURATION: 96 % | DIASTOLIC BLOOD PRESSURE: 67 MMHG | WEIGHT: 140 LBS | RESPIRATION RATE: 18 BRPM | BODY MASS INDEX: 20.04 KG/M2

## 2022-12-22 LAB
ANION GAP SERPL CALCULATED.3IONS-SCNC: 10 MMOL/L (ref 3–16)
BASOPHILS ABSOLUTE: 0 K/UL (ref 0–0.2)
BASOPHILS RELATIVE PERCENT: 0.8 %
BUN BLDV-MCNC: <2 MG/DL (ref 7–20)
CALCIUM SERPL-MCNC: 8.9 MG/DL (ref 8.3–10.6)
CHLORIDE BLD-SCNC: 104 MMOL/L (ref 99–110)
CO2: 27 MMOL/L (ref 21–32)
CREAT SERPL-MCNC: <0.5 MG/DL (ref 0.6–1.1)
EOSINOPHILS ABSOLUTE: 0.1 K/UL (ref 0–0.6)
EOSINOPHILS RELATIVE PERCENT: 1.9 %
GFR SERPL CREATININE-BSD FRML MDRD: >60 ML/MIN/{1.73_M2}
GLUCOSE BLD-MCNC: 83 MG/DL (ref 70–99)
HCT VFR BLD CALC: 35.8 % (ref 36–48)
HEMOGLOBIN: 12.3 G/DL (ref 12–16)
LYMPHOCYTES ABSOLUTE: 2 K/UL (ref 1–5.1)
LYMPHOCYTES RELATIVE PERCENT: 38.6 %
MAGNESIUM: 1.8 MG/DL (ref 1.8–2.4)
MCH RBC QN AUTO: 30.7 PG (ref 26–34)
MCHC RBC AUTO-ENTMCNC: 34.2 G/DL (ref 31–36)
MCV RBC AUTO: 89.7 FL (ref 80–100)
MONOCYTES ABSOLUTE: 0.5 K/UL (ref 0–1.3)
MONOCYTES RELATIVE PERCENT: 8.8 %
NEUTROPHILS ABSOLUTE: 2.6 K/UL (ref 1.7–7.7)
NEUTROPHILS RELATIVE PERCENT: 49.9 %
PDW BLD-RTO: 13.2 % (ref 12.4–15.4)
PLATELET # BLD: 179 K/UL (ref 135–450)
PMV BLD AUTO: 8.6 FL (ref 5–10.5)
POTASSIUM SERPL-SCNC: 3.4 MMOL/L (ref 3.5–5.1)
RBC # BLD: 4 M/UL (ref 4–5.2)
SODIUM BLD-SCNC: 141 MMOL/L (ref 136–145)
WBC # BLD: 5.3 K/UL (ref 4–11)

## 2022-12-22 PROCEDURE — 85025 COMPLETE CBC W/AUTO DIFF WBC: CPT

## 2022-12-22 PROCEDURE — 6370000000 HC RX 637 (ALT 250 FOR IP): Performed by: STUDENT IN AN ORGANIZED HEALTH CARE EDUCATION/TRAINING PROGRAM

## 2022-12-22 PROCEDURE — 6360000002 HC RX W HCPCS

## 2022-12-22 PROCEDURE — C9113 INJ PANTOPRAZOLE SODIUM, VIA: HCPCS | Performed by: STUDENT IN AN ORGANIZED HEALTH CARE EDUCATION/TRAINING PROGRAM

## 2022-12-22 PROCEDURE — 6370000000 HC RX 637 (ALT 250 FOR IP)

## 2022-12-22 PROCEDURE — 2580000003 HC RX 258

## 2022-12-22 PROCEDURE — 6360000002 HC RX W HCPCS: Performed by: INTERNAL MEDICINE

## 2022-12-22 PROCEDURE — 83735 ASSAY OF MAGNESIUM: CPT

## 2022-12-22 PROCEDURE — 6360000002 HC RX W HCPCS: Performed by: STUDENT IN AN ORGANIZED HEALTH CARE EDUCATION/TRAINING PROGRAM

## 2022-12-22 PROCEDURE — 80048 BASIC METABOLIC PNL TOTAL CA: CPT

## 2022-12-22 RX ORDER — AMOXICILLIN AND CLAVULANATE POTASSIUM 875; 125 MG/1; MG/1
1 TABLET, FILM COATED ORAL EVERY 12 HOURS SCHEDULED
Qty: 8 TABLET | Refills: 0 | Status: SHIPPED | OUTPATIENT
Start: 2022-12-22 | End: 2022-12-22 | Stop reason: SDUPTHER

## 2022-12-22 RX ORDER — AMOXICILLIN AND CLAVULANATE POTASSIUM 875; 125 MG/1; MG/1
1 TABLET, FILM COATED ORAL EVERY 12 HOURS SCHEDULED
Qty: 6 TABLET | Refills: 0 | Status: SHIPPED | OUTPATIENT
Start: 2022-12-22 | End: 2022-12-22 | Stop reason: SDUPTHER

## 2022-12-22 RX ORDER — POTASSIUM CHLORIDE 7.45 MG/ML
10 INJECTION INTRAVENOUS PRN
Status: DISCONTINUED | OUTPATIENT
Start: 2022-12-22 | End: 2022-12-22 | Stop reason: HOSPADM

## 2022-12-22 RX ORDER — HEPARIN SODIUM (PORCINE) LOCK FLUSH IV SOLN 100 UNIT/ML 100 UNIT/ML
500 SOLUTION INTRAVENOUS PRN
Status: DISCONTINUED | OUTPATIENT
Start: 2022-12-22 | End: 2022-12-22 | Stop reason: HOSPADM

## 2022-12-22 RX ORDER — HEPARIN SODIUM (PORCINE) LOCK FLUSH IV SOLN 100 UNIT/ML 100 UNIT/ML
100 SOLUTION INTRAVENOUS PRN
Status: DISCONTINUED | OUTPATIENT
Start: 2022-12-22 | End: 2022-12-22

## 2022-12-22 RX ORDER — POTASSIUM BICARBONATE 25 MEQ/1
25 TABLET, EFFERVESCENT ORAL DAILY
Qty: 5 TABLET | Refills: 0 | Status: SHIPPED | OUTPATIENT
Start: 2022-12-22 | End: 2022-12-27

## 2022-12-22 RX ORDER — POTASSIUM CHLORIDE 20 MEQ/1
40 TABLET, EXTENDED RELEASE ORAL PRN
Status: DISCONTINUED | OUTPATIENT
Start: 2022-12-22 | End: 2022-12-22 | Stop reason: HOSPADM

## 2022-12-22 RX ORDER — AMOXICILLIN AND CLAVULANATE POTASSIUM 875; 125 MG/1; MG/1
1 TABLET, FILM COATED ORAL EVERY 12 HOURS SCHEDULED
Qty: 8 TABLET | Refills: 0 | Status: SHIPPED | OUTPATIENT
Start: 2022-12-22 | End: 2022-12-26

## 2022-12-22 RX ADMIN — Medication 125 MG: at 12:15

## 2022-12-22 RX ADMIN — ENOXAPARIN SODIUM 40 MG: 100 INJECTION SUBCUTANEOUS at 08:28

## 2022-12-22 RX ADMIN — METOPROLOL SUCCINATE 12.5 MG: 25 TABLET, EXTENDED RELEASE ORAL at 08:27

## 2022-12-22 RX ADMIN — PYRIDOSTIGMINE BROMIDE 90 MG: 60 TABLET ORAL at 08:27

## 2022-12-22 RX ADMIN — Medication 125 MG: at 01:16

## 2022-12-22 RX ADMIN — AMOXICILLIN AND CLAVULANATE POTASSIUM 1 TABLET: 875; 125 TABLET, FILM COATED ORAL at 08:27

## 2022-12-22 RX ADMIN — PANTOPRAZOLE SODIUM 40 MG: 40 INJECTION, POWDER, LYOPHILIZED, FOR SOLUTION INTRAVENOUS at 08:29

## 2022-12-22 RX ADMIN — POTASSIUM BICARBONATE 20 MEQ: 782 TABLET, EFFERVESCENT ORAL at 08:27

## 2022-12-22 RX ADMIN — Medication 125 MG: at 06:18

## 2022-12-22 RX ADMIN — HEPARIN SODIUM (PORCINE) LOCK FLUSH IV SOLN 100 UNIT/ML 500 UNITS: 100 SOLUTION at 12:26

## 2022-12-22 RX ADMIN — SODIUM CHLORIDE, PRESERVATIVE FREE 10 ML: 5 INJECTION INTRAVENOUS at 08:28

## 2022-12-22 NOTE — CARE COORDINATION
CTN contacted Jocelyn Morgan with [facility] [address] [place] [address], [facility]. [phone]. [facility], [facility]. able to accept this patient.  Jocelyn Morgan will pull referral from [facility] for [facility] by 12/24  Electronically signed by Fe Prasad LPN on 00/08/7134 at 2:30 PM

## 2022-12-22 NOTE — PROGRESS NOTES
Patient discharged home via friends vehicle. Port de-accessed, tele removed. New prescriptions filled for vancomycin, potassium, and augmentin. Explained use, side effects, and when to take medications. Patient verbalized understanding. Denies any questions. Wheeled down to vehicle by this RN.

## 2022-12-22 NOTE — CARE COORDINATION
Case Management Assessment            Discharge Note                    Date / Time of Note: 12/22/2022 1:36 PM                  Discharge Note Completed by: Mitch Cabezas RN    Patient Name: Naresh Lewis   YOB: 1970  Diagnosis: Diverticulitis [K57.92]  Enteritis [K52.9]  Diverticulitis of colon [K57.32]  Ventricular bigeminy [I49.8]  Nausea vomiting and diarrhea [R11.2, R19.7]  Continuous severe abdominal pain [R10.9]  Abdominal pain, unspecified abdominal location [R10.9]   Date / Time: 12/19/2022  7:58 PM    Current PCP: No primary care provider on file. Clinic patient: No    Hospitalization in the last 30 days: No    Advance Directives:  Code Status: Full Code  [place] DNR form completed and on chart: No    Financial:  Payor: Nhan Orta / Plan: Marlyn Brown / Product Type: *No Product type* /      Pharmacy:    [facility], [address] [phone] - F [phone]  [address]  Phone: [phone] Fax: [phone]      Assistance purchasing medications?: Potential Assistance Purchasing Medications: No  Assistance provided by Case Management: None at this time    Does patient want to participate in local refill/ meds to beds program?: Yes    Meds To Beds General Rules:  1. Can ONLY be done Monday- Friday between 8:30am-5pm  2. Prescription(s) must be in pharmacy by 3pm to be filled same day  3. Copy of patient's insurance/ prescription drug card and patient face sheet must be sent along with the prescription(s)  4. Cost of Rx cannot be added to hospital bill. If financial assistance is needed, please contact unit  or ;  or  CANNOT provide pharmacy voucher for patients co-pays  5.  Patients can then  the prescription on their way out of the hospital at discharge, or pharmacy can deliver to the bedside if staff is available. (payment due at time of pick-up or delivery - cash, check, or card accepted)     Able to afford home medications/ co-pay costs: Yes    ADLS:  Current PT AM-PAC Score: 20 /24  Current OT AM-PAC Score: 22 /24      DISCHARGE Disposition: Home with [address]: SN PTOT      LOC at discharge: Not Applicable  SOPHIE Completed: No    Notification completed in HENS/PAS?:  Not Applicable    IMM Completed:   Not Indicated    Transportation:  Transportation PLAN for discharge: friend   Mode of Transport: Private Car  Reason for medical transport: Not Applicable  Name of Transport Company: Not Applicable  Time of Transport: when family available  . Transport form completed: No    Home Care:  Home Care ordered at discharge: Yes  [address]:  Juliane Penaloza  [phone]  Phone: [phone]  Fax: [phone]  Orders faxed: Yes    Durable Medical Equipment:  DME Provider: NIGEL  Equipment obtained during hospitalization: NA    Home Oxygen and Respiratory Equipment:  Oxygen needed at discharge?: No  [address]: Not Applicable  Portable tank available for discharge?: No    Dialysis:  Dialysis patient: No    Dialysis Center:  Not Applicable    Hospice Services:  Location: Not Applicable  Agency: Not Applicable    Consents signed: Not Indicated    Referrals made at [facility] for outpatient continued care:  Not Applicable    Additional CM Notes:     CM  confirmed  d/c home later today    Patient to follow up out pt  as  instructed:    New Rx :  Meds to Beds  Utilized:         these medications from any pharmacy with your printed prescription  amoxicillin-clavulanate  potassium bicarbonate  vancomycin    Call Dr. Edgar Bartholomew MD  [address] [place] [address]   [phone]     Schedule an appointment with Dinesh Gonzalez MD as soon as possible for a visit  [address] [place] [address]   [phone]    Cm  d/w  pt and  SRIKANTH Landry  and all in agreement  .      The Plan for Transition of Care is related to the following treatment goals of Diverticulitis [K57.92]  Enteritis [K52.9]  Diverticulitis of colon [K57.32]  Ventricular bigeminy [I49.8]  Nausea vomiting and diarrhea [R11.2, R19.7]  Continuous severe abdominal pain [R10.9]  Abdominal pain, unspecified abdominal location [R10.9]    The Patient and/or patient representative Henrietta Vargas and her family were provided with a choice of provider and agrees with the discharge plan Yes    Freedom of choice list was provided with basic dialogue that supports the patient's individualized plan of care/goals and shares the quality data associated with the providers.  Yes    Care Transitions patient: No    Jessica Alvarado RN  [facility]  Case Management Department  Ph: [phone]

## 2022-12-22 NOTE — PLAN OF CARE
Problem: Pain  Goal: Verbalizes/displays adequate comfort level or baseline comfort level  Description: Patient pain level 1/10  12/22/2022 0420 by Sunni Pagan RN  Outcome: Progressing  12/21/2022 1843 by Bess Encarnacion RN  Outcome: Progressing  12/21/2022 1842 by Bess Encarnacion RN  Outcome: Progressing     Problem: Safety - Adult  Goal: Free from fall injury  12/22/2022 0420 by Sunni Pagan RN  Outcome: Progressing  12/21/2022 1840 by Bess Encarnacion RN  Outcome: Progressing     Problem: Gastrointestinal - Adult  Goal: Minimal or absence of nausea and vomiting  Outcome: Progressing  Goal: Maintains or returns to baseline bowel function  Outcome: Progressing  Goal: Maintains adequate nutritional intake  Outcome: Progressing

## 2022-12-24 LAB
BLOOD CULTURE, ROUTINE: NORMAL
CULTURE, BLOOD 2: NORMAL

## 2023-06-16 ENCOUNTER — APPOINTMENT (OUTPATIENT)
Dept: CT IMAGING | Age: 53
DRG: 244 | End: 2023-06-16
Payer: MEDICAID

## 2023-06-16 ENCOUNTER — HOSPITAL ENCOUNTER (INPATIENT)
Age: 53
LOS: 4 days | Discharge: HOME | DRG: 244 | End: 2023-06-21
Attending: EMERGENCY MEDICINE | Admitting: INTERNAL MEDICINE
Payer: MEDICAID

## 2023-06-16 DIAGNOSIS — E83.42 HYPOMAGNESEMIA: ICD-10-CM

## 2023-06-16 DIAGNOSIS — K57.32 DIVERTICULITIS OF COLON: Primary | ICD-10-CM

## 2023-06-16 DIAGNOSIS — E87.6 HYPOKALEMIA: ICD-10-CM

## 2023-06-16 LAB
ALBUMIN SERPL-MCNC: 4.8 G/DL (ref 3.4–5)
ALBUMIN/GLOB SERPL: 1.1 {RATIO} (ref 1.1–2.2)
ALP SERPL-CCNC: 99 U/L (ref 40–129)
ALT SERPL-CCNC: 22 U/L (ref 10–40)
ANION GAP SERPL CALCULATED.3IONS-SCNC: 16 MMOL/L (ref 3–16)
AST SERPL-CCNC: 28 U/L (ref 15–37)
BASOPHILS # BLD: 0.1 K/UL (ref 0–0.2)
BASOPHILS NFR BLD: 0.5 %
BILIRUB SERPL-MCNC: 0.4 MG/DL (ref 0–1)
BUN SERPL-MCNC: 15 MG/DL (ref 7–20)
CALCIUM SERPL-MCNC: 10.4 MG/DL (ref 8.3–10.6)
CHLORIDE SERPL-SCNC: 103 MMOL/L (ref 99–110)
CO2 SERPL-SCNC: 21 MMOL/L (ref 21–32)
CREAT SERPL-MCNC: <0.5 MG/DL (ref 0.6–1.1)
DEPRECATED RDW RBC AUTO: 13 % (ref 12.4–15.4)
EOSINOPHIL # BLD: 0 K/UL (ref 0–0.6)
EOSINOPHIL NFR BLD: 0.1 %
GFR SERPLBLD CREATININE-BSD FMLA CKD-EPI: >60 ML/MIN/{1.73_M2}
GLUCOSE SERPL-MCNC: 204 MG/DL (ref 70–99)
HCT VFR BLD AUTO: 40.6 % (ref 36–48)
HGB BLD-MCNC: 13.9 G/DL (ref 12–16)
LIPASE SERPL-CCNC: 11 U/L (ref 13–60)
LYMPHOCYTES # BLD: 0.9 K/UL (ref 1–5.1)
LYMPHOCYTES NFR BLD: 7.1 %
MAGNESIUM SERPL-MCNC: 1.7 MG/DL (ref 1.8–2.4)
MCH RBC QN AUTO: 31.5 PG (ref 26–34)
MCHC RBC AUTO-ENTMCNC: 34.3 G/DL (ref 31–36)
MCV RBC AUTO: 91.8 FL (ref 80–100)
MONOCYTES # BLD: 0.2 K/UL (ref 0–1.3)
MONOCYTES NFR BLD: 1.6 %
NEUTROPHILS # BLD: 11.8 K/UL (ref 1.7–7.7)
NEUTROPHILS NFR BLD: 90.7 %
PLATELET # BLD AUTO: 269 K/UL (ref 135–450)
PMV BLD AUTO: 10 FL (ref 5–10.5)
POTASSIUM SERPL-SCNC: 3 MMOL/L (ref 3.5–5.1)
PROT SERPL-MCNC: 9.2 G/DL (ref 6.4–8.2)
RBC # BLD AUTO: 4.42 M/UL (ref 4–5.2)
SODIUM SERPL-SCNC: 140 MMOL/L (ref 136–145)
TROPONIN, HIGH SENSITIVITY: 12 NG/L (ref 0–14)
WBC # BLD AUTO: 13.1 K/UL (ref 4–11)

## 2023-06-16 PROCEDURE — 2580000003 HC RX 258: Performed by: EMERGENCY MEDICINE

## 2023-06-16 PROCEDURE — 6360000004 HC RX CONTRAST MEDICATION: Performed by: EMERGENCY MEDICINE

## 2023-06-16 PROCEDURE — 6360000002 HC RX W HCPCS: Performed by: EMERGENCY MEDICINE

## 2023-06-16 PROCEDURE — 85025 COMPLETE CBC W/AUTO DIFF WBC: CPT

## 2023-06-16 PROCEDURE — 99285 EMERGENCY DEPT VISIT HI MDM: CPT

## 2023-06-16 PROCEDURE — 74177 CT ABD & PELVIS W/CONTRAST: CPT

## 2023-06-16 PROCEDURE — 83735 ASSAY OF MAGNESIUM: CPT

## 2023-06-16 PROCEDURE — 96374 THER/PROPH/DIAG INJ IV PUSH: CPT

## 2023-06-16 PROCEDURE — 80053 COMPREHEN METABOLIC PANEL: CPT

## 2023-06-16 PROCEDURE — 84484 ASSAY OF TROPONIN QUANT: CPT

## 2023-06-16 PROCEDURE — 83690 ASSAY OF LIPASE: CPT

## 2023-06-16 PROCEDURE — 96375 TX/PRO/DX INJ NEW DRUG ADDON: CPT

## 2023-06-16 RX ORDER — ONDANSETRON 2 MG/ML
4 INJECTION INTRAMUSCULAR; INTRAVENOUS ONCE
Status: COMPLETED | OUTPATIENT
Start: 2023-06-16 | End: 2023-06-16

## 2023-06-16 RX ORDER — 0.9 % SODIUM CHLORIDE 0.9 %
1000 INTRAVENOUS SOLUTION INTRAVENOUS ONCE
Status: COMPLETED | OUTPATIENT
Start: 2023-06-16 | End: 2023-06-17

## 2023-06-16 RX ADMIN — IOHEXOL 100 ML: 350 INJECTION, SOLUTION INTRAVENOUS at 23:46

## 2023-06-16 RX ADMIN — HYDROMORPHONE HYDROCHLORIDE 0.5 MG: 1 INJECTION, SOLUTION INTRAMUSCULAR; INTRAVENOUS; SUBCUTANEOUS at 23:55

## 2023-06-16 RX ADMIN — ONDANSETRON 4 MG: 2 INJECTION INTRAMUSCULAR; INTRAVENOUS at 23:56

## 2023-06-16 RX ADMIN — SODIUM CHLORIDE 1000 ML: 9 INJECTION, SOLUTION INTRAVENOUS at 23:56

## 2023-06-16 ASSESSMENT — PAIN SCALES - GENERAL
PAINLEVEL_OUTOF10: 4
PAINLEVEL_OUTOF10: 4

## 2023-06-16 ASSESSMENT — PAIN - FUNCTIONAL ASSESSMENT: PAIN_FUNCTIONAL_ASSESSMENT: 0-10

## 2023-06-16 ASSESSMENT — PAIN DESCRIPTION - DESCRIPTORS: DESCRIPTORS: CRAMPING

## 2023-06-16 ASSESSMENT — PAIN DESCRIPTION - FREQUENCY: FREQUENCY: INTERMITTENT

## 2023-06-16 ASSESSMENT — PAIN DESCRIPTION - ORIENTATION: ORIENTATION: LEFT;LOWER

## 2023-06-16 ASSESSMENT — PAIN DESCRIPTION - LOCATION: LOCATION: ABDOMEN

## 2023-06-16 ASSESSMENT — PAIN DESCRIPTION - PAIN TYPE: TYPE: ACUTE PAIN

## 2023-06-17 PROBLEM — E43 SEVERE PROTEIN-CALORIE MALNUTRITION (HCC): Status: RESOLVED | Noted: 2023-06-17 | Resolved: 2023-06-17

## 2023-06-17 PROBLEM — J42 CHRONIC BRONCHITIS (HCC): Status: ACTIVE | Noted: 2023-06-17

## 2023-06-17 PROBLEM — E43 SEVERE PROTEIN-CALORIE MALNUTRITION (HCC): Status: ACTIVE | Noted: 2023-06-17

## 2023-06-17 PROBLEM — E44.0 PROTEIN-CALORIE MALNUTRITION, MODERATE (HCC): Status: ACTIVE | Noted: 2023-06-17

## 2023-06-17 PROBLEM — J42 CHRONIC BRONCHITIS (HCC): Status: RESOLVED | Noted: 2023-06-17 | Resolved: 2023-06-17

## 2023-06-17 PROBLEM — G70.00 MYASTHENIA GRAVIS (HCC): Status: ACTIVE | Noted: 2023-06-17

## 2023-06-17 PROBLEM — E87.6 HYPOKALEMIA: Status: ACTIVE | Noted: 2023-06-17

## 2023-06-17 PROBLEM — E43 SEVERE MALNUTRITION (HCC): Chronic | Status: ACTIVE | Noted: 2023-06-17

## 2023-06-17 PROBLEM — E83.42 HYPOMAGNESEMIA: Status: ACTIVE | Noted: 2023-06-17

## 2023-06-17 PROBLEM — J44.9 COPD (CHRONIC OBSTRUCTIVE PULMONARY DISEASE) (HCC): Status: ACTIVE | Noted: 2023-06-17

## 2023-06-17 LAB
ANION GAP SERPL CALCULATED.3IONS-SCNC: 8 MMOL/L (ref 3–16)
BACTERIA URNS QL MICRO: ABNORMAL /HPF
BILIRUB UR QL STRIP.AUTO: NEGATIVE
BUN SERPL-MCNC: 9 MG/DL (ref 7–20)
CALCIUM SERPL-MCNC: 9 MG/DL (ref 8.3–10.6)
CHLORIDE SERPL-SCNC: 102 MMOL/L (ref 99–110)
CLARITY UR: CLEAR
CO2 SERPL-SCNC: 25 MMOL/L (ref 21–32)
COLOR UR: YELLOW
CORTIS SERPL-MCNC: 27.2 UG/DL
CORTIS SERPL-MCNC: 6.4 UG/DL
CREAT SERPL-MCNC: <0.5 MG/DL (ref 0.6–1.1)
EPI CELLS #/AREA URNS HPF: ABNORMAL /HPF (ref 0–5)
EST. AVERAGE GLUCOSE BLD GHB EST-MCNC: 108.3 MG/DL
GFR SERPLBLD CREATININE-BSD FMLA CKD-EPI: >60 ML/MIN/{1.73_M2}
GLUCOSE BLD-MCNC: 102 MG/DL (ref 70–99)
GLUCOSE BLD-MCNC: 157 MG/DL (ref 70–99)
GLUCOSE BLD-MCNC: 178 MG/DL (ref 70–99)
GLUCOSE SERPL-MCNC: 83 MG/DL (ref 70–99)
GLUCOSE UR STRIP.AUTO-MCNC: NEGATIVE MG/DL
HBA1C MFR BLD: 5.4 %
HGB UR QL STRIP.AUTO: ABNORMAL
KETONES UR STRIP.AUTO-MCNC: NEGATIVE MG/DL
LEUKOCYTE ESTERASE UR QL STRIP.AUTO: NEGATIVE
MAGNESIUM SERPL-MCNC: 2.2 MG/DL (ref 1.8–2.4)
MAGNESIUM SERPL-MCNC: 2.2 MG/DL (ref 1.8–2.4)
NITRITE UR QL STRIP.AUTO: NEGATIVE
PERFORMED ON: ABNORMAL
PH UR STRIP.AUTO: 7 [PH] (ref 5–8)
PHOSPHATE SERPL-MCNC: 2.4 MG/DL (ref 2.5–4.9)
POTASSIUM SERPL-SCNC: 3.3 MMOL/L (ref 3.5–5.1)
PREALB SERPL-MCNC: 18.5 MG/DL (ref 20–40)
PROT UR STRIP.AUTO-MCNC: ABNORMAL MG/DL
RBC #/AREA URNS HPF: ABNORMAL /HPF (ref 0–4)
SODIUM SERPL-SCNC: 135 MMOL/L (ref 136–145)
SP GR UR STRIP.AUTO: <=1.005 (ref 1–1.03)
UA COMPLETE W REFLEX CULTURE PNL UR: ABNORMAL
UA DIPSTICK W REFLEX MICRO PNL UR: YES
URN SPEC COLLECT METH UR: ABNORMAL
UROBILINOGEN UR STRIP-ACNC: 0.2 E.U./DL
WBC #/AREA URNS HPF: ABNORMAL /HPF (ref 0–5)

## 2023-06-17 PROCEDURE — 2580000003 HC RX 258: Performed by: EMERGENCY MEDICINE

## 2023-06-17 PROCEDURE — 87449 NOS EACH ORGANISM AG IA: CPT

## 2023-06-17 PROCEDURE — 6360000002 HC RX W HCPCS: Performed by: STUDENT IN AN ORGANIZED HEALTH CARE EDUCATION/TRAINING PROGRAM

## 2023-06-17 PROCEDURE — 36415 COLL VENOUS BLD VENIPUNCTURE: CPT

## 2023-06-17 PROCEDURE — 83036 HEMOGLOBIN GLYCOSYLATED A1C: CPT

## 2023-06-17 PROCEDURE — 80048 BASIC METABOLIC PNL TOTAL CA: CPT

## 2023-06-17 PROCEDURE — 2580000003 HC RX 258: Performed by: STUDENT IN AN ORGANIZED HEALTH CARE EDUCATION/TRAINING PROGRAM

## 2023-06-17 PROCEDURE — 6370000000 HC RX 637 (ALT 250 FOR IP): Performed by: STUDENT IN AN ORGANIZED HEALTH CARE EDUCATION/TRAINING PROGRAM

## 2023-06-17 PROCEDURE — 87324 CLOSTRIDIUM AG IA: CPT

## 2023-06-17 PROCEDURE — 86160 COMPLEMENT ANTIGEN: CPT

## 2023-06-17 PROCEDURE — C9113 INJ PANTOPRAZOLE SODIUM, VIA: HCPCS | Performed by: STUDENT IN AN ORGANIZED HEALTH CARE EDUCATION/TRAINING PROGRAM

## 2023-06-17 PROCEDURE — 83735 ASSAY OF MAGNESIUM: CPT

## 2023-06-17 PROCEDURE — 6360000002 HC RX W HCPCS: Performed by: EMERGENCY MEDICINE

## 2023-06-17 PROCEDURE — 87493 C DIFF AMPLIFIED PROBE: CPT

## 2023-06-17 PROCEDURE — 86038 ANTINUCLEAR ANTIBODIES: CPT

## 2023-06-17 PROCEDURE — 81001 URINALYSIS AUTO W/SCOPE: CPT

## 2023-06-17 PROCEDURE — 1200000000 HC SEMI PRIVATE

## 2023-06-17 PROCEDURE — 6360000002 HC RX W HCPCS: Performed by: INTERNAL MEDICINE

## 2023-06-17 PROCEDURE — 84134 ASSAY OF PREALBUMIN: CPT

## 2023-06-17 PROCEDURE — 84100 ASSAY OF PHOSPHORUS: CPT

## 2023-06-17 PROCEDURE — 82533 TOTAL CORTISOL: CPT

## 2023-06-17 RX ORDER — PANTOPRAZOLE SODIUM 40 MG/10ML
40 INJECTION, POWDER, LYOPHILIZED, FOR SOLUTION INTRAVENOUS DAILY
Status: DISCONTINUED | OUTPATIENT
Start: 2023-06-17 | End: 2023-06-19

## 2023-06-17 RX ORDER — ONDANSETRON 2 MG/ML
4 INJECTION INTRAMUSCULAR; INTRAVENOUS EVERY 6 HOURS PRN
Status: DISCONTINUED | OUTPATIENT
Start: 2023-06-17 | End: 2023-06-21 | Stop reason: HOSPADM

## 2023-06-17 RX ORDER — ONDANSETRON 4 MG/1
4 TABLET, ORALLY DISINTEGRATING ORAL EVERY 8 HOURS PRN
Status: DISCONTINUED | OUTPATIENT
Start: 2023-06-17 | End: 2023-06-21 | Stop reason: HOSPADM

## 2023-06-17 RX ORDER — SODIUM CHLORIDE 9 MG/ML
INJECTION, SOLUTION INTRAVENOUS PRN
Status: DISCONTINUED | OUTPATIENT
Start: 2023-06-17 | End: 2023-06-21 | Stop reason: HOSPADM

## 2023-06-17 RX ORDER — COSYNTROPIN 0.25 MG/ML
250 INJECTION, POWDER, FOR SOLUTION INTRAMUSCULAR; INTRAVENOUS ONCE
Status: COMPLETED | OUTPATIENT
Start: 2023-06-17 | End: 2023-06-17

## 2023-06-17 RX ORDER — PYRIDOSTIGMINE BROMIDE 60 MG/1
90 TABLET ORAL EVERY 8 HOURS SCHEDULED
Status: DISCONTINUED | OUTPATIENT
Start: 2023-06-17 | End: 2023-06-21 | Stop reason: HOSPADM

## 2023-06-17 RX ORDER — ACETAMINOPHEN 325 MG/1
650 TABLET ORAL EVERY 6 HOURS PRN
Status: DISCONTINUED | OUTPATIENT
Start: 2023-06-17 | End: 2023-06-21 | Stop reason: HOSPADM

## 2023-06-17 RX ORDER — POTASSIUM CHLORIDE 29.8 MG/ML
20 INJECTION INTRAVENOUS ONCE
Status: COMPLETED | OUTPATIENT
Start: 2023-06-17 | End: 2023-06-17

## 2023-06-17 RX ORDER — GLUCAGON 1 MG/ML
1 KIT INJECTION PRN
Status: DISCONTINUED | OUTPATIENT
Start: 2023-06-17 | End: 2023-06-21 | Stop reason: HOSPADM

## 2023-06-17 RX ORDER — ALBUTEROL SULFATE 2.5 MG/3ML
2.5 SOLUTION RESPIRATORY (INHALATION) EVERY 4 HOURS PRN
Status: DISCONTINUED | OUTPATIENT
Start: 2023-06-17 | End: 2023-06-21 | Stop reason: HOSPADM

## 2023-06-17 RX ORDER — POTASSIUM CHLORIDE 7.45 MG/ML
10 INJECTION INTRAVENOUS
Status: DISCONTINUED | OUTPATIENT
Start: 2023-06-17 | End: 2023-06-17

## 2023-06-17 RX ORDER — POLYETHYLENE GLYCOL 3350 17 G/17G
17 POWDER, FOR SOLUTION ORAL DAILY PRN
Status: DISCONTINUED | OUTPATIENT
Start: 2023-06-17 | End: 2023-06-21 | Stop reason: HOSPADM

## 2023-06-17 RX ORDER — ACETAMINOPHEN 650 MG/1
650 SUPPOSITORY RECTAL EVERY 6 HOURS PRN
Status: DISCONTINUED | OUTPATIENT
Start: 2023-06-17 | End: 2023-06-21 | Stop reason: HOSPADM

## 2023-06-17 RX ORDER — INSULIN LISPRO 100 [IU]/ML
0-8 INJECTION, SOLUTION INTRAVENOUS; SUBCUTANEOUS
Status: DISCONTINUED | OUTPATIENT
Start: 2023-06-17 | End: 2023-06-21 | Stop reason: HOSPADM

## 2023-06-17 RX ORDER — SODIUM CHLORIDE 0.9 % (FLUSH) 0.9 %
5-40 SYRINGE (ML) INJECTION EVERY 12 HOURS SCHEDULED
Status: DISCONTINUED | OUTPATIENT
Start: 2023-06-17 | End: 2023-06-21 | Stop reason: HOSPADM

## 2023-06-17 RX ORDER — DEXTROSE MONOHYDRATE 100 MG/ML
INJECTION, SOLUTION INTRAVENOUS CONTINUOUS PRN
Status: DISCONTINUED | OUTPATIENT
Start: 2023-06-17 | End: 2023-06-21 | Stop reason: HOSPADM

## 2023-06-17 RX ORDER — POTASSIUM CHLORIDE 7.45 MG/ML
10 INJECTION INTRAVENOUS
Status: DISPENSED | OUTPATIENT
Start: 2023-06-17 | End: 2023-06-17

## 2023-06-17 RX ORDER — ENOXAPARIN SODIUM 100 MG/ML
40 INJECTION SUBCUTANEOUS DAILY
Status: DISCONTINUED | OUTPATIENT
Start: 2023-06-17 | End: 2023-06-21 | Stop reason: HOSPADM

## 2023-06-17 RX ORDER — MAGNESIUM SULFATE IN WATER 40 MG/ML
2000 INJECTION, SOLUTION INTRAVENOUS ONCE
Status: COMPLETED | OUTPATIENT
Start: 2023-06-17 | End: 2023-06-17

## 2023-06-17 RX ORDER — SODIUM CHLORIDE, SODIUM LACTATE, POTASSIUM CHLORIDE, CALCIUM CHLORIDE 600; 310; 30; 20 MG/100ML; MG/100ML; MG/100ML; MG/100ML
INJECTION, SOLUTION INTRAVENOUS CONTINUOUS
Status: DISCONTINUED | OUTPATIENT
Start: 2023-06-17 | End: 2023-06-21 | Stop reason: HOSPADM

## 2023-06-17 RX ORDER — INSULIN LISPRO 100 [IU]/ML
0-4 INJECTION, SOLUTION INTRAVENOUS; SUBCUTANEOUS NIGHTLY
Status: DISCONTINUED | OUTPATIENT
Start: 2023-06-17 | End: 2023-06-21 | Stop reason: HOSPADM

## 2023-06-17 RX ORDER — SODIUM CHLORIDE 0.9 % (FLUSH) 0.9 %
5-40 SYRINGE (ML) INJECTION PRN
Status: DISCONTINUED | OUTPATIENT
Start: 2023-06-17 | End: 2023-06-21 | Stop reason: HOSPADM

## 2023-06-17 RX ADMIN — POTASSIUM CHLORIDE 10 MEQ: 7.46 INJECTION, SOLUTION INTRAVENOUS at 09:26

## 2023-06-17 RX ADMIN — PIPERACILLIN AND TAZOBACTAM 3375 MG: 3; .375 INJECTION, POWDER, LYOPHILIZED, FOR SOLUTION INTRAVENOUS at 03:12

## 2023-06-17 RX ADMIN — PYRIDOSTIGMINE BROMIDE 90 MG: 60 TABLET ORAL at 17:28

## 2023-06-17 RX ADMIN — PANTOPRAZOLE SODIUM 40 MG: 40 INJECTION, POWDER, FOR SOLUTION INTRAVENOUS at 06:26

## 2023-06-17 RX ADMIN — SODIUM CHLORIDE, POTASSIUM CHLORIDE, SODIUM LACTATE AND CALCIUM CHLORIDE: 600; 310; 30; 20 INJECTION, SOLUTION INTRAVENOUS at 05:24

## 2023-06-17 RX ADMIN — POTASSIUM CHLORIDE 10 MEQ: 10 INJECTION, SOLUTION INTRAVENOUS at 01:31

## 2023-06-17 RX ADMIN — MAGNESIUM SULFATE HEPTAHYDRATE 2000 MG: 40 INJECTION, SOLUTION INTRAVENOUS at 02:32

## 2023-06-17 RX ADMIN — SODIUM CHLORIDE, POTASSIUM CHLORIDE, SODIUM LACTATE AND CALCIUM CHLORIDE: 600; 310; 30; 20 INJECTION, SOLUTION INTRAVENOUS at 22:10

## 2023-06-17 RX ADMIN — SODIUM CHLORIDE, POTASSIUM CHLORIDE, SODIUM LACTATE AND CALCIUM CHLORIDE: 600; 310; 30; 20 INJECTION, SOLUTION INTRAVENOUS at 08:23

## 2023-06-17 RX ADMIN — PIPERACILLIN AND TAZOBACTAM 3375 MG: 3; .375 INJECTION, POWDER, LYOPHILIZED, FOR SOLUTION INTRAVENOUS at 13:31

## 2023-06-17 RX ADMIN — COSYNTROPIN 250 MCG: 0.25 INJECTION, POWDER, LYOPHILIZED, FOR SOLUTION INTRAMUSCULAR; INTRAVENOUS at 13:17

## 2023-06-17 RX ADMIN — PYRIDOSTIGMINE BROMIDE 90 MG: 60 TABLET ORAL at 21:32

## 2023-06-17 RX ADMIN — PYRIDOSTIGMINE BROMIDE 90 MG: 60 TABLET ORAL at 07:40

## 2023-06-17 RX ADMIN — POTASSIUM CHLORIDE 10 MEQ: 7.46 INJECTION, SOLUTION INTRAVENOUS at 06:30

## 2023-06-17 RX ADMIN — PIPERACILLIN AND TAZOBACTAM 3375 MG: 3; .375 INJECTION, POWDER, LYOPHILIZED, FOR SOLUTION INTRAVENOUS at 19:57

## 2023-06-17 RX ADMIN — POTASSIUM CHLORIDE 20 MEQ: 400 INJECTION, SOLUTION INTRAVENOUS at 13:20

## 2023-06-17 RX ADMIN — SODIUM CHLORIDE 10 ML/HR: 9 INJECTION, SOLUTION INTRAVENOUS at 13:28

## 2023-06-17 RX ADMIN — POTASSIUM CHLORIDE 10 MEQ: 7.46 INJECTION, SOLUTION INTRAVENOUS at 07:36

## 2023-06-17 ASSESSMENT — PAIN DESCRIPTION - ONSET: ONSET: ON-GOING

## 2023-06-17 ASSESSMENT — PAIN SCALES - GENERAL
PAINLEVEL_OUTOF10: 0
PAINLEVEL_OUTOF10: 0
PAINLEVEL_OUTOF10: 4
PAINLEVEL_OUTOF10: 3
PAINLEVEL_OUTOF10: 0

## 2023-06-17 ASSESSMENT — ENCOUNTER SYMPTOMS
SHORTNESS OF BREATH: 0
NAUSEA: 0
ABDOMINAL PAIN: 1
COUGH: 0
CONSTIPATION: 0
ANAL BLEEDING: 0
VOMITING: 0

## 2023-06-17 ASSESSMENT — PAIN DESCRIPTION - ORIENTATION: ORIENTATION: LOWER

## 2023-06-17 ASSESSMENT — PAIN DESCRIPTION - PAIN TYPE: TYPE: ACUTE PAIN

## 2023-06-17 ASSESSMENT — PAIN DESCRIPTION - FREQUENCY: FREQUENCY: CONTINUOUS

## 2023-06-17 ASSESSMENT — PAIN - FUNCTIONAL ASSESSMENT: PAIN_FUNCTIONAL_ASSESSMENT: 0-10

## 2023-06-17 ASSESSMENT — PAIN DESCRIPTION - LOCATION: LOCATION: ABDOMEN

## 2023-06-17 ASSESSMENT — PAIN DESCRIPTION - DESCRIPTORS: DESCRIPTORS: ACHING

## 2023-06-18 LAB
ALBUMIN SERPL-MCNC: 3.8 G/DL (ref 3.4–5)
ANION GAP SERPL CALCULATED.3IONS-SCNC: 10 MMOL/L (ref 3–16)
BASOPHILS # BLD: 0 K/UL (ref 0–0.2)
BASOPHILS NFR BLD: 0.3 %
BUN SERPL-MCNC: 6 MG/DL (ref 7–20)
C DIFF TOX A+B STL QL IA: NORMAL
CALCIUM SERPL-MCNC: 9.1 MG/DL (ref 8.3–10.6)
CHLORIDE SERPL-SCNC: 107 MMOL/L (ref 99–110)
CO2 SERPL-SCNC: 25 MMOL/L (ref 21–32)
CREAT SERPL-MCNC: <0.5 MG/DL (ref 0.6–1.1)
CRP SERPL-MCNC: 5.5 MG/L (ref 0–5.1)
DEPRECATED RDW RBC AUTO: 13.5 % (ref 12.4–15.4)
EOSINOPHIL # BLD: 0 K/UL (ref 0–0.6)
EOSINOPHIL NFR BLD: 0.1 %
ERYTHROCYTE [SEDIMENTATION RATE] IN BLOOD BY WESTERGREN METHOD: 25 MM/HR (ref 0–30)
GFR SERPLBLD CREATININE-BSD FMLA CKD-EPI: >60 ML/MIN/{1.73_M2}
GLUCOSE BLD-MCNC: 110 MG/DL (ref 70–99)
GLUCOSE BLD-MCNC: 111 MG/DL (ref 70–99)
GLUCOSE BLD-MCNC: 128 MG/DL (ref 70–99)
GLUCOSE BLD-MCNC: 173 MG/DL (ref 70–99)
GLUCOSE SERPL-MCNC: 95 MG/DL (ref 70–99)
HCT VFR BLD AUTO: 33.5 % (ref 36–48)
HGB BLD-MCNC: 11.3 G/DL (ref 12–16)
LYMPHOCYTES # BLD: 1.5 K/UL (ref 1–5.1)
LYMPHOCYTES NFR BLD: 24.7 %
MAGNESIUM SERPL-MCNC: 2 MG/DL (ref 1.8–2.4)
MCH RBC QN AUTO: 31.4 PG (ref 26–34)
MCHC RBC AUTO-ENTMCNC: 33.8 G/DL (ref 31–36)
MCV RBC AUTO: 92.9 FL (ref 80–100)
MONOCYTES # BLD: 0.4 K/UL (ref 0–1.3)
MONOCYTES NFR BLD: 6.6 %
NEUTROPHILS # BLD: 4.2 K/UL (ref 1.7–7.7)
NEUTROPHILS NFR BLD: 68.3 %
PERFORMED ON: ABNORMAL
PHOSPHATE SERPL-MCNC: 2.5 MG/DL (ref 2.5–4.9)
PLATELET # BLD AUTO: 173 K/UL (ref 135–450)
PMV BLD AUTO: 10.3 FL (ref 5–10.5)
POTASSIUM SERPL-SCNC: 3 MMOL/L (ref 3.5–5.1)
POTASSIUM SERPL-SCNC: 3 MMOL/L (ref 3.5–5.1)
PTH-INTACT SERPL-MCNC: 28.5 PG/ML (ref 14–72)
RBC # BLD AUTO: 3.6 M/UL (ref 4–5.2)
SODIUM SERPL-SCNC: 142 MMOL/L (ref 136–145)
WBC # BLD AUTO: 6.1 K/UL (ref 4–11)

## 2023-06-18 PROCEDURE — 6360000002 HC RX W HCPCS

## 2023-06-18 PROCEDURE — 86140 C-REACTIVE PROTEIN: CPT

## 2023-06-18 PROCEDURE — 83970 ASSAY OF PARATHORMONE: CPT

## 2023-06-18 PROCEDURE — 83735 ASSAY OF MAGNESIUM: CPT

## 2023-06-18 PROCEDURE — 85652 RBC SED RATE AUTOMATED: CPT

## 2023-06-18 PROCEDURE — 1200000000 HC SEMI PRIVATE

## 2023-06-18 PROCEDURE — C9113 INJ PANTOPRAZOLE SODIUM, VIA: HCPCS | Performed by: STUDENT IN AN ORGANIZED HEALTH CARE EDUCATION/TRAINING PROGRAM

## 2023-06-18 PROCEDURE — 6360000002 HC RX W HCPCS: Performed by: INTERNAL MEDICINE

## 2023-06-18 PROCEDURE — 85025 COMPLETE CBC W/AUTO DIFF WBC: CPT

## 2023-06-18 PROCEDURE — 80069 RENAL FUNCTION PANEL: CPT

## 2023-06-18 PROCEDURE — 2580000003 HC RX 258: Performed by: STUDENT IN AN ORGANIZED HEALTH CARE EDUCATION/TRAINING PROGRAM

## 2023-06-18 PROCEDURE — 6360000002 HC RX W HCPCS: Performed by: STUDENT IN AN ORGANIZED HEALTH CARE EDUCATION/TRAINING PROGRAM

## 2023-06-18 PROCEDURE — 6370000000 HC RX 637 (ALT 250 FOR IP): Performed by: STUDENT IN AN ORGANIZED HEALTH CARE EDUCATION/TRAINING PROGRAM

## 2023-06-18 RX ORDER — POTASSIUM CHLORIDE 29.8 MG/ML
20 INJECTION INTRAVENOUS
Status: COMPLETED | OUTPATIENT
Start: 2023-06-18 | End: 2023-06-18

## 2023-06-18 RX ORDER — METOCLOPRAMIDE HYDROCHLORIDE 5 MG/ML
10 INJECTION INTRAMUSCULAR; INTRAVENOUS EVERY 6 HOURS PRN
Status: DISCONTINUED | OUTPATIENT
Start: 2023-06-18 | End: 2023-06-21 | Stop reason: HOSPADM

## 2023-06-18 RX ADMIN — PYRIDOSTIGMINE BROMIDE 90 MG: 60 TABLET ORAL at 17:05

## 2023-06-18 RX ADMIN — SODIUM CHLORIDE 10 ML/HR: 9 INJECTION, SOLUTION INTRAVENOUS at 08:20

## 2023-06-18 RX ADMIN — METOCLOPRAMIDE HYDROCHLORIDE 10 MG: 5 INJECTION INTRAMUSCULAR; INTRAVENOUS at 13:08

## 2023-06-18 RX ADMIN — PYRIDOSTIGMINE BROMIDE 90 MG: 60 TABLET ORAL at 22:10

## 2023-06-18 RX ADMIN — PIPERACILLIN AND TAZOBACTAM 3375 MG: 3; .375 INJECTION, POWDER, LYOPHILIZED, FOR SOLUTION INTRAVENOUS at 03:07

## 2023-06-18 RX ADMIN — SODIUM CHLORIDE, PRESERVATIVE FREE 10 ML: 5 INJECTION INTRAVENOUS at 09:26

## 2023-06-18 RX ADMIN — POTASSIUM CHLORIDE 20 MEQ: 400 INJECTION, SOLUTION INTRAVENOUS at 13:14

## 2023-06-18 RX ADMIN — POTASSIUM CHLORIDE 20 MEQ: 400 INJECTION, SOLUTION INTRAVENOUS at 09:28

## 2023-06-18 RX ADMIN — PANTOPRAZOLE SODIUM 40 MG: 40 INJECTION, POWDER, FOR SOLUTION INTRAVENOUS at 09:26

## 2023-06-18 RX ADMIN — PIPERACILLIN AND TAZOBACTAM 3375 MG: 3; .375 INJECTION, POWDER, LYOPHILIZED, FOR SOLUTION INTRAVENOUS at 20:17

## 2023-06-18 RX ADMIN — SODIUM CHLORIDE, PRESERVATIVE FREE 10 ML: 5 INJECTION INTRAVENOUS at 20:12

## 2023-06-18 RX ADMIN — PIPERACILLIN AND TAZOBACTAM 3375 MG: 3; .375 INJECTION, POWDER, LYOPHILIZED, FOR SOLUTION INTRAVENOUS at 13:15

## 2023-06-18 RX ADMIN — POTASSIUM CHLORIDE 20 MEQ: 400 INJECTION, SOLUTION INTRAVENOUS at 08:23

## 2023-06-18 RX ADMIN — PYRIDOSTIGMINE BROMIDE 90 MG: 60 TABLET ORAL at 05:36

## 2023-06-18 ASSESSMENT — ENCOUNTER SYMPTOMS
COUGH: 0
VOMITING: 0
CONSTIPATION: 0
ANAL BLEEDING: 0
NAUSEA: 0
SHORTNESS OF BREATH: 0
ABDOMINAL PAIN: 1

## 2023-06-18 ASSESSMENT — PAIN SCALES - GENERAL
PAINLEVEL_OUTOF10: 0

## 2023-06-19 ENCOUNTER — APPOINTMENT (OUTPATIENT)
Dept: NUCLEAR MEDICINE | Age: 53
DRG: 244 | End: 2023-06-19
Payer: MEDICAID

## 2023-06-19 PROBLEM — Z17.0 MALIGNANT NEOPLASM OF LEFT BREAST IN FEMALE, ESTROGEN RECEPTOR POSITIVE (HCC): Status: ACTIVE | Noted: 2023-06-19

## 2023-06-19 PROBLEM — C50.912 MALIGNANT NEOPLASM OF LEFT BREAST IN FEMALE, ESTROGEN RECEPTOR POSITIVE (HCC): Status: ACTIVE | Noted: 2023-06-19

## 2023-06-19 LAB
ALBUMIN SERPL-MCNC: 3.4 G/DL (ref 3.4–5)
ANA SER QL IA: NEGATIVE
ANION GAP SERPL CALCULATED.3IONS-SCNC: 10 MMOL/L (ref 3–16)
BASOPHILS # BLD: 0 K/UL (ref 0–0.2)
BASOPHILS NFR BLD: 0.2 %
BUN SERPL-MCNC: 4 MG/DL (ref 7–20)
C DIFF TOX GENS STL QL NAA+PROBE: ABNORMAL
C3 SERPL-MCNC: 110.4 MG/DL (ref 90–180)
C4 SERPL-MCNC: 44.2 MG/DL (ref 10–40)
CALCIUM SERPL-MCNC: 8.8 MG/DL (ref 8.3–10.6)
CHLORIDE SERPL-SCNC: 109 MMOL/L (ref 99–110)
CO2 SERPL-SCNC: 25 MMOL/L (ref 21–32)
CREAT SERPL-MCNC: <0.5 MG/DL (ref 0.6–1.1)
DEPRECATED RDW RBC AUTO: 13.7 % (ref 12.4–15.4)
EOSINOPHIL # BLD: 0 K/UL (ref 0–0.6)
EOSINOPHIL NFR BLD: 0.1 %
GFR SERPLBLD CREATININE-BSD FMLA CKD-EPI: >60 ML/MIN/{1.73_M2}
GLUCOSE BLD-MCNC: 104 MG/DL (ref 70–99)
GLUCOSE BLD-MCNC: 152 MG/DL (ref 70–99)
GLUCOSE BLD-MCNC: 159 MG/DL (ref 70–99)
GLUCOSE BLD-MCNC: 95 MG/DL (ref 70–99)
GLUCOSE SERPL-MCNC: 88 MG/DL (ref 70–99)
HCT VFR BLD AUTO: 30.1 % (ref 36–48)
HGB BLD-MCNC: 10.2 G/DL (ref 12–16)
LYMPHOCYTES # BLD: 1.9 K/UL (ref 1–5.1)
LYMPHOCYTES NFR BLD: 27.7 %
MAGNESIUM SERPL-MCNC: 1.8 MG/DL (ref 1.8–2.4)
MCH RBC QN AUTO: 32 PG (ref 26–34)
MCHC RBC AUTO-ENTMCNC: 33.7 G/DL (ref 31–36)
MCV RBC AUTO: 94.8 FL (ref 80–100)
MONOCYTES # BLD: 0.4 K/UL (ref 0–1.3)
MONOCYTES NFR BLD: 5.8 %
NEUTROPHILS # BLD: 4.5 K/UL (ref 1.7–7.7)
NEUTROPHILS NFR BLD: 66.2 %
ORGANISM: ABNORMAL
PERFORMED ON: ABNORMAL
PERFORMED ON: NORMAL
PHOSPHATE SERPL-MCNC: 2.8 MG/DL (ref 2.5–4.9)
PLATELET # BLD AUTO: 141 K/UL (ref 135–450)
PMV BLD AUTO: 10.3 FL (ref 5–10.5)
POTASSIUM SERPL-SCNC: 2.7 MMOL/L (ref 3.5–5.1)
POTASSIUM SERPL-SCNC: 2.9 MMOL/L (ref 3.5–5.1)
RBC # BLD AUTO: 3.18 M/UL (ref 4–5.2)
SODIUM SERPL-SCNC: 144 MMOL/L (ref 136–145)
WBC # BLD AUTO: 6.8 K/UL (ref 4–11)

## 2023-06-19 PROCEDURE — 83735 ASSAY OF MAGNESIUM: CPT

## 2023-06-19 PROCEDURE — 2580000003 HC RX 258: Performed by: INTERNAL MEDICINE

## 2023-06-19 PROCEDURE — 1200000000 HC SEMI PRIVATE

## 2023-06-19 PROCEDURE — 2580000003 HC RX 258: Performed by: STUDENT IN AN ORGANIZED HEALTH CARE EDUCATION/TRAINING PROGRAM

## 2023-06-19 PROCEDURE — 6360000002 HC RX W HCPCS: Performed by: STUDENT IN AN ORGANIZED HEALTH CARE EDUCATION/TRAINING PROGRAM

## 2023-06-19 PROCEDURE — 6370000000 HC RX 637 (ALT 250 FOR IP): Performed by: STUDENT IN AN ORGANIZED HEALTH CARE EDUCATION/TRAINING PROGRAM

## 2023-06-19 PROCEDURE — C9113 INJ PANTOPRAZOLE SODIUM, VIA: HCPCS | Performed by: STUDENT IN AN ORGANIZED HEALTH CARE EDUCATION/TRAINING PROGRAM

## 2023-06-19 PROCEDURE — 85025 COMPLETE CBC W/AUTO DIFF WBC: CPT

## 2023-06-19 PROCEDURE — 80069 RENAL FUNCTION PANEL: CPT

## 2023-06-19 PROCEDURE — 6370000000 HC RX 637 (ALT 250 FOR IP): Performed by: INTERNAL MEDICINE

## 2023-06-19 PROCEDURE — 6360000002 HC RX W HCPCS: Performed by: INTERNAL MEDICINE

## 2023-06-19 RX ORDER — LACTOBACILLUS RHAMNOSUS GG 10B CELL
1 CAPSULE ORAL DAILY
Status: DISCONTINUED | OUTPATIENT
Start: 2023-06-20 | End: 2023-06-21 | Stop reason: HOSPADM

## 2023-06-19 RX ORDER — POTASSIUM CHLORIDE 29.8 MG/ML
20 INJECTION INTRAVENOUS
Status: COMPLETED | OUTPATIENT
Start: 2023-06-19 | End: 2023-06-19

## 2023-06-19 RX ORDER — LACTOBACILLUS RHAMNOSUS GG 10B CELL
1 CAPSULE ORAL DAILY
Status: DISCONTINUED | OUTPATIENT
Start: 2023-06-19 | End: 2023-06-19

## 2023-06-19 RX ORDER — PANTOPRAZOLE SODIUM 40 MG/10ML
40 INJECTION, POWDER, LYOPHILIZED, FOR SOLUTION INTRAVENOUS DAILY
Status: DISCONTINUED | OUTPATIENT
Start: 2023-06-20 | End: 2023-06-21 | Stop reason: HOSPADM

## 2023-06-19 RX ORDER — IPRATROPIUM BROMIDE AND ALBUTEROL SULFATE 2.5; .5 MG/3ML; MG/3ML
1 SOLUTION RESPIRATORY (INHALATION) EVERY 4 HOURS PRN
Status: DISCONTINUED | OUTPATIENT
Start: 2023-06-19 | End: 2023-06-21 | Stop reason: HOSPADM

## 2023-06-19 RX ADMIN — POTASSIUM CHLORIDE 20 MEQ: 400 INJECTION, SOLUTION INTRAVENOUS at 09:24

## 2023-06-19 RX ADMIN — PYRIDOSTIGMINE BROMIDE 90 MG: 60 TABLET ORAL at 16:47

## 2023-06-19 RX ADMIN — SODIUM CHLORIDE, POTASSIUM CHLORIDE, SODIUM LACTATE AND CALCIUM CHLORIDE: 600; 310; 30; 20 INJECTION, SOLUTION INTRAVENOUS at 09:29

## 2023-06-19 RX ADMIN — PIPERACILLIN AND TAZOBACTAM 3375 MG: 3; .375 INJECTION, POWDER, LYOPHILIZED, FOR SOLUTION INTRAVENOUS at 20:47

## 2023-06-19 RX ADMIN — PANTOPRAZOLE SODIUM 40 MG: 40 INJECTION, POWDER, FOR SOLUTION INTRAVENOUS at 09:21

## 2023-06-19 RX ADMIN — VANCOMYCIN HYDROCHLORIDE 500 MG: 500 INJECTION, POWDER, LYOPHILIZED, FOR SOLUTION INTRAVENOUS at 12:54

## 2023-06-19 RX ADMIN — Medication 125 MG: at 20:47

## 2023-06-19 RX ADMIN — POTASSIUM CHLORIDE 20 MEQ: 400 INJECTION, SOLUTION INTRAVENOUS at 11:09

## 2023-06-19 RX ADMIN — METOCLOPRAMIDE HYDROCHLORIDE 10 MG: 5 INJECTION INTRAMUSCULAR; INTRAVENOUS at 03:15

## 2023-06-19 RX ADMIN — PYRIDOSTIGMINE BROMIDE 90 MG: 60 TABLET ORAL at 05:40

## 2023-06-19 RX ADMIN — PIPERACILLIN AND TAZOBACTAM 3375 MG: 3; .375 INJECTION, POWDER, LYOPHILIZED, FOR SOLUTION INTRAVENOUS at 12:15

## 2023-06-19 RX ADMIN — PIPERACILLIN AND TAZOBACTAM 3375 MG: 3; .375 INJECTION, POWDER, LYOPHILIZED, FOR SOLUTION INTRAVENOUS at 03:12

## 2023-06-19 RX ADMIN — PYRIDOSTIGMINE BROMIDE 90 MG: 60 TABLET ORAL at 20:48

## 2023-06-19 RX ADMIN — Medication 125 MG: at 16:44

## 2023-06-19 ASSESSMENT — ENCOUNTER SYMPTOMS
VOMITING: 0
BLOOD IN STOOL: 0
DIARRHEA: 1
ANAL BLEEDING: 0
SHORTNESS OF BREATH: 0
COUGH: 0
ABDOMINAL PAIN: 0
CONSTIPATION: 0
NAUSEA: 0

## 2023-06-19 ASSESSMENT — PAIN DESCRIPTION - LOCATION: LOCATION: ABDOMEN

## 2023-06-19 ASSESSMENT — PAIN SCALES - GENERAL
PAINLEVEL_OUTOF10: 2
PAINLEVEL_OUTOF10: 3

## 2023-06-19 NOTE — PLAN OF CARE
Problem: Discharge Planning  Goal: Discharge to home or other facility with appropriate resources  Outcome: Progressing     Problem: Pain  Goal: Verbalizes/displays adequate comfort level or baseline comfort level  Outcome: Progressing     Problem: Safety - Adult  Goal: Free from fall injury  Outcome: Progressing  Flowsheets (Taken 6/18/2023 1947)  Free From Fall Injury: Instruct family/caregiver on patient safety     Problem: Skin/Tissue Integrity  Goal: Absence of new skin breakdown  Description: 1. Monitor for areas of redness and/or skin breakdown  2. Assess vascular access sites hourly  3. Every 4-6 hours minimum:  Change oxygen saturation probe site  4. Every 4-6 hours:  If on nasal continuous positive airway pressure, respiratory therapy assess nares and determine need for appliance change or resting period.   Outcome: Progressing     Problem: Nutrition Deficit:  Goal: Optimize nutritional status  Outcome: Progressing

## 2023-06-19 NOTE — PLAN OF CARE
Problem: Discharge Planning  Goal: Discharge to home or other facility with appropriate resources  6/19/2023 1553 by Rosa Elena Roberts RN  Note: She plans to return home at discharge. Problem: Pain  Goal: Verbalizes/displays adequate comfort level or baseline comfort level  6/19/2023 1553 by Rosa Elena Roberts RN  Note: She complains of mild abdominal discomfort. She rates it a 3 or 4. Problem: Safety - Adult  Goal: Free from fall injury  6/19/2023 1553 by Rosa Elena Roberts RN  Note: Patient has a steady gait. She is up ad kal in the room. She calls appropriately for assistance. Problem: Skin/Tissue Integrity  Goal: Absence of new skin breakdown  Description: 1. Monitor for areas of redness and/or skin breakdown  2. Assess vascular access sites hourly  3. Every 4-6 hours minimum:  Change oxygen saturation probe site  4. Every 4-6 hours:  If on nasal continuous positive airway pressure, respiratory therapy assess nares and determine need for appliance change or resting period. 6/19/2023 1553 by Rosa Elena Roberts RN  Note: Rectal area red and tender. Patient applying moisture barrier after each bowel movement. Problem: Nutrition Deficit:  Goal: Optimize nutritional status  6/19/2023 1553 by Rosa Elena Roberts RN  Note: She is on a clear liquid diet. Tolerating small amounts. Gastric emptying study in the morning.

## 2023-06-19 NOTE — CARE COORDINATION
Pt is from home alone and is indp at baseline. Pt uses a rollator in the community. Pt has a shower chair and is active w/[employer] waiver services. A home health aide comes for 4h 3 times per week. Pt had HHC in the past, open to it if she needs it. She has 3 friends who live close and provide emotional support. Her family lives in [place]. She may need a Lyft home if she cant find a ride. Pt has a planned surgery for bowel resection on 7/12. Pt tested positive for Cdiff currently on IV abx. Pt will need HHC and Iv abx arranged if needed for DC.      Electronically signed by HENRY Cornejo, INES on 6/19/2023 at 12:11 PM  [phone]

## 2023-06-19 NOTE — PLAN OF CARE
Patient given vancomycin enema. She was unable to retain any of it. Basically an irrigation. Fluid returned clear.

## 2023-06-20 ENCOUNTER — APPOINTMENT (OUTPATIENT)
Dept: NUCLEAR MEDICINE | Age: 53
DRG: 244 | End: 2023-06-20
Payer: MEDICAID

## 2023-06-20 LAB
ALBUMIN SERPL-MCNC: 3.4 G/DL (ref 3.4–5)
ANION GAP SERPL CALCULATED.3IONS-SCNC: 10 MMOL/L (ref 3–16)
BASOPHILS # BLD: 0 K/UL (ref 0–0.2)
BASOPHILS NFR BLD: 0.8 %
BUN SERPL-MCNC: <2 MG/DL (ref 7–20)
CALCIUM SERPL-MCNC: 8.8 MG/DL (ref 8.3–10.6)
CHLORIDE SERPL-SCNC: 107 MMOL/L (ref 99–110)
CO2 SERPL-SCNC: 27 MMOL/L (ref 21–32)
CREAT SERPL-MCNC: <0.5 MG/DL (ref 0.6–1.1)
DEPRECATED RDW RBC AUTO: 13.6 % (ref 12.4–15.4)
EOSINOPHIL # BLD: 0.1 K/UL (ref 0–0.6)
EOSINOPHIL NFR BLD: 1.6 %
GFR SERPLBLD CREATININE-BSD FMLA CKD-EPI: >60 ML/MIN/{1.73_M2}
GLUCOSE BLD-MCNC: 103 MG/DL (ref 70–99)
GLUCOSE BLD-MCNC: 108 MG/DL (ref 70–99)
GLUCOSE BLD-MCNC: 99 MG/DL (ref 70–99)
GLUCOSE SERPL-MCNC: 86 MG/DL (ref 70–99)
HCT VFR BLD AUTO: 32.2 % (ref 36–48)
HGB BLD-MCNC: 10.9 G/DL (ref 12–16)
LYMPHOCYTES # BLD: 1.9 K/UL (ref 1–5.1)
LYMPHOCYTES NFR BLD: 33.4 %
MAGNESIUM SERPL-MCNC: 1.7 MG/DL (ref 1.8–2.4)
MCH RBC QN AUTO: 31.6 PG (ref 26–34)
MCHC RBC AUTO-ENTMCNC: 34 G/DL (ref 31–36)
MCV RBC AUTO: 93 FL (ref 80–100)
MONOCYTES # BLD: 0.5 K/UL (ref 0–1.3)
MONOCYTES NFR BLD: 8.5 %
NEUTROPHILS # BLD: 3.2 K/UL (ref 1.7–7.7)
NEUTROPHILS NFR BLD: 55.7 %
PERFORMED ON: ABNORMAL
PERFORMED ON: ABNORMAL
PERFORMED ON: NORMAL
PHOSPHATE SERPL-MCNC: 4 MG/DL (ref 2.5–4.9)
PLATELET # BLD AUTO: 142 K/UL (ref 135–450)
PMV BLD AUTO: 9.6 FL (ref 5–10.5)
POTASSIUM SERPL-SCNC: 3 MMOL/L (ref 3.5–5.1)
POTASSIUM SERPL-SCNC: 3 MMOL/L (ref 3.5–5.1)
RBC # BLD AUTO: 3.46 M/UL (ref 4–5.2)
SODIUM SERPL-SCNC: 144 MMOL/L (ref 136–145)
WBC # BLD AUTO: 5.7 K/UL (ref 4–11)

## 2023-06-20 PROCEDURE — 6370000000 HC RX 637 (ALT 250 FOR IP): Performed by: INTERNAL MEDICINE

## 2023-06-20 PROCEDURE — 6360000002 HC RX W HCPCS: Performed by: INTERNAL MEDICINE

## 2023-06-20 PROCEDURE — 1200000000 HC SEMI PRIVATE

## 2023-06-20 PROCEDURE — A9541 TC99M SULFUR COLLOID: HCPCS

## 2023-06-20 PROCEDURE — 6360000002 HC RX W HCPCS

## 2023-06-20 PROCEDURE — 83735 ASSAY OF MAGNESIUM: CPT

## 2023-06-20 PROCEDURE — C9113 INJ PANTOPRAZOLE SODIUM, VIA: HCPCS | Performed by: INTERNAL MEDICINE

## 2023-06-20 PROCEDURE — 2580000003 HC RX 258: Performed by: STUDENT IN AN ORGANIZED HEALTH CARE EDUCATION/TRAINING PROGRAM

## 2023-06-20 PROCEDURE — 78264 GASTRIC EMPTYING IMG STUDY: CPT

## 2023-06-20 PROCEDURE — 3430000000 HC RX DIAGNOSTIC RADIOPHARMACEUTICAL

## 2023-06-20 PROCEDURE — 85025 COMPLETE CBC W/AUTO DIFF WBC: CPT

## 2023-06-20 PROCEDURE — 6360000002 HC RX W HCPCS: Performed by: STUDENT IN AN ORGANIZED HEALTH CARE EDUCATION/TRAINING PROGRAM

## 2023-06-20 PROCEDURE — 6370000000 HC RX 637 (ALT 250 FOR IP): Performed by: STUDENT IN AN ORGANIZED HEALTH CARE EDUCATION/TRAINING PROGRAM

## 2023-06-20 PROCEDURE — 80069 RENAL FUNCTION PANEL: CPT

## 2023-06-20 RX ORDER — VANCOMYCIN HYDROCHLORIDE 125 MG/1
CAPSULE ORAL
Qty: 68 CAPSULE | Refills: 0 | Status: SHIPPED | OUTPATIENT
Start: 2023-06-20 | End: 2023-06-21 | Stop reason: SDUPTHER

## 2023-06-20 RX ORDER — FAMOTIDINE 20 MG/1
20 TABLET, FILM COATED ORAL 2 TIMES DAILY
Qty: 60 TABLET | Refills: 3 | Status: SHIPPED | OUTPATIENT
Start: 2023-06-20

## 2023-06-20 RX ORDER — SACCHAROMYCES BOULARDII 250 MG
250 CAPSULE ORAL 2 TIMES DAILY
Qty: 60 CAPSULE | Refills: 1 | Status: SHIPPED | OUTPATIENT
Start: 2023-06-20

## 2023-06-20 RX ORDER — POTASSIUM CHLORIDE 29.8 MG/ML
20 INJECTION INTRAVENOUS
Status: COMPLETED | OUTPATIENT
Start: 2023-06-20 | End: 2023-06-20

## 2023-06-20 RX ADMIN — Medication 1 CAPSULE: at 12:47

## 2023-06-20 RX ADMIN — PYRIDOSTIGMINE BROMIDE 90 MG: 60 TABLET ORAL at 14:45

## 2023-06-20 RX ADMIN — Medication 125 MG: at 20:25

## 2023-06-20 RX ADMIN — POTASSIUM CHLORIDE 20 MEQ: 400 INJECTION, SOLUTION INTRAVENOUS at 14:34

## 2023-06-20 RX ADMIN — PYRIDOSTIGMINE BROMIDE 90 MG: 60 TABLET ORAL at 20:25

## 2023-06-20 RX ADMIN — SODIUM CHLORIDE, POTASSIUM CHLORIDE, SODIUM LACTATE AND CALCIUM CHLORIDE: 600; 310; 30; 20 INJECTION, SOLUTION INTRAVENOUS at 15:10

## 2023-06-20 RX ADMIN — POTASSIUM CHLORIDE 20 MEQ: 400 INJECTION, SOLUTION INTRAVENOUS at 12:52

## 2023-06-20 RX ADMIN — Medication 125 MG: at 05:07

## 2023-06-20 RX ADMIN — PYRIDOSTIGMINE BROMIDE 90 MG: 60 TABLET ORAL at 05:06

## 2023-06-20 RX ADMIN — Medication 125 MG: at 17:01

## 2023-06-20 RX ADMIN — PANTOPRAZOLE SODIUM 40 MG: 40 INJECTION, POWDER, FOR SOLUTION INTRAVENOUS at 12:47

## 2023-06-20 RX ADMIN — PIPERACILLIN AND TAZOBACTAM 3375 MG: 3; .375 INJECTION, POWDER, LYOPHILIZED, FOR SOLUTION INTRAVENOUS at 05:09

## 2023-06-20 RX ADMIN — POTASSIUM CHLORIDE 20 MEQ: 400 INJECTION, SOLUTION INTRAVENOUS at 15:27

## 2023-06-20 RX ADMIN — Medication 1.2 MILLICURIE: at 08:04

## 2023-06-20 RX ADMIN — Medication 125 MG: at 12:49

## 2023-06-20 ASSESSMENT — PAIN SCALES - GENERAL: PAINLEVEL_OUTOF10: 0

## 2023-06-20 NOTE — DISCHARGE INSTRUCTIONS
Thank you for visiting [facility]    Your medications have changed. Please start taking the following:  Vancomycin 125mg capsule. Take as following: Take 1 capsule 4 times per day for 10 days  Take 1 capsule twice daily for 7 days  Take 1 capsule once daily for 7 days  Take 1 capsule every 2 days for two weeks. Pepcid 20mg tablet. Take one tablet twice daily. This will replace your previous Pantoprazole. Florastor. Take 1 tablet twice daily. Please STOP taking the following:  Pantoprazole 40mg tablet. Please call your gastroenterologist to arrange follow up with them as needed. Extra Heart Failure sites:     http://www.WeVideo/   --- this is American Heart Association interactive Healthier Living with Heart Failure guidebook. Please click hyperlink or copy / paste link into search bar. Use your mouse to scroll through the pages. Lots of information about weight monitoring, diet tips, activity, meds, etc    HF [place] kian  -- this is a free smart phone kian available for iPhone and Android download. Use your phone to track sodium / fluid intake, zone tool symptom tracking, weights, medications, etc. Click on this hyperlink  HF [place] Kian   for QR code for easy download. DASH (Dietary Approach to Stop Hypertension) diet --  [email or web address].no -- this diet is a flexible eating plan that promotes heart healthy eating style. Click on hyperlink or copy / paste link into search bar. Lots of low sodium recipes and tips.     [email or web address]  -- more free recipes

## 2023-06-20 NOTE — CARE COORDINATION
Discharge order. Concern because still on clear liquid diet unable to advance at this time. Patient to receive K+ IV , also, today. Pending results from Gastric Emptying nuclear study this a.m. Perfectserved Dr. Carlos Jackson (resident) to see if we really want to discharge patient today. Awaiting response. CM will continue to follow patient until discharge. Electronically signed by Fior Porter RN on 6/20/2023 at 2:12 PM    Addendum:  Spoke to patient. Patient states that she has been on CLD for 2 weeks. Patient stated that her bowel resection is scheduled for July 12, 2023. Perfectserved Dr. Agustin Narvaez attending). Discharge order taken out at this time. Patient will be discharged when attending states she is medically ready. CM will continue to follow patient until discharge.   Electronically signed by Fior Porter RN on 6/20/2023 at 4:08 PM

## 2023-06-20 NOTE — DISCHARGE INSTR - COC
Continuity of Care Form    Patient Name: Ced Horn   :  1970  MRN:  [id number]    Admit date:  2023  Discharge date:  ***    Code Status Order: Full Code   Advance Directives:     Admitting Physician:  Lisy Mendes MD  PCP: No primary care provider on file. Discharging Nurse: [facility] Unit/Room#: 1412/0360-31  Discharging Unit Phone Number: ***    Emergency Contact:   Extended Emergency Contact Information  Primary Emergency Contact: no,one  Address: [address] [place] [address] Phone: [phone]  Relation: None    Past Surgical History:  History reviewed. No pertinent surgical history.     Immunization History:   Immunization History   Administered Date(s) Administered    COVID-19, PFIZER Bivalent, DO NOT Dilute, (age 12y+), IM, 27 mcg/0.3 mL 10/26/2022       Active Problems:  Patient Active Problem List   Diagnosis Code    Continuous severe abdominal pain R10.9    Diverticulitis K57.92    Severe malnutrition ([address] E43    COPD (chronic obstructive pulmonary disease) ([address] J44.9    Myasthenia gravis ([address] G70.00    Protein-calorie malnutrition, moderate ([address] E44.0    Hypokalemia E87.6    Hypomagnesemia E83.42    Malignant neoplasm of left breast in female, estrogen receptor positive ([address] C50.912, Z17.0       Isolation/Infection:   Isolation            C Diff Contact          Patient Infection Status       Infection Onset Added Last Indicated Last Indicated By Review Planned Expiration Resolved Resolved By    C-diff (Clostridium difficile) 23 C. difficile toxin Molecular 23      Resolved    C-diff Rule Out 23 C. difficile toxin Molecular (Ordered)   23 Rule-Out Test Resulted    C-diff Rule Out 23 Clostridium difficile toxin/antigen (Ordered)   23 Rule-Out Test Resulted    C-diff (Clostridium difficile) 22

## 2023-06-20 NOTE — PLAN OF CARE
Problem: Safety - Adult  Goal: Free from fall injury  6/20/2023 0535 by Wendi Correia RN  Outcome: Progressing  PT remained safe throughout the night

## 2023-06-20 NOTE — PLAN OF CARE
Problem: Safety - Adult  Goal: Free from fall injury  6/20/2023 1550 by Yeyo Leo RN  Note: She is not a fall risk. Up ad kal in room. Gait steady. Problem: Discharge Planning  Goal: Discharge to home or other facility with appropriate resources  Note: She will return home at discharge. Plan is for discharge tomorrow . Problem: Pain  Goal: Verbalizes/displays adequate comfort level or baseline comfort level  Note: She has mild gas pain. Declines need for any pain medication. Problem: Skin/Tissue Integrity  Goal: Absence of new skin breakdown  Description: 1. Monitor for areas of redness and/or skin breakdown  2. Assess vascular access sites hourly  3. Every 4-6 hours minimum:  Change oxygen saturation probe site  4. Every 4-6 hours:  If on nasal continuous positive airway pressure, respiratory therapy assess nares and determine need for appliance change or resting period. Note: Rectal area red and tender. Barrier cream applied by patient after each BM. Problem: Nutrition Deficit:  Goal: Optimize nutritional status  Note: Patient remains on a liquid diet. Tolerating liquids. Continues to have diarrhea. Problem: Chronic Conditions and Co-morbidities  Goal: Patient's chronic conditions and co-morbidity symptoms are monitored and maintained or improved  Note: Potassium 3. IV replacement given.

## 2023-06-21 VITALS
BODY MASS INDEX: 18.04 KG/M2 | DIASTOLIC BLOOD PRESSURE: 84 MMHG | HEART RATE: 69 BPM | WEIGHT: 126 LBS | TEMPERATURE: 98.6 F | OXYGEN SATURATION: 98 % | HEIGHT: 70 IN | SYSTOLIC BLOOD PRESSURE: 129 MMHG | RESPIRATION RATE: 16 BRPM

## 2023-06-21 LAB
GLUCOSE BLD-MCNC: 87 MG/DL (ref 70–99)
GLUCOSE BLD-MCNC: 94 MG/DL (ref 70–99)
PERFORMED ON: NORMAL
PERFORMED ON: NORMAL

## 2023-06-21 PROCEDURE — C9113 INJ PANTOPRAZOLE SODIUM, VIA: HCPCS | Performed by: INTERNAL MEDICINE

## 2023-06-21 PROCEDURE — 6370000000 HC RX 637 (ALT 250 FOR IP): Performed by: INTERNAL MEDICINE

## 2023-06-21 PROCEDURE — 6370000000 HC RX 637 (ALT 250 FOR IP): Performed by: STUDENT IN AN ORGANIZED HEALTH CARE EDUCATION/TRAINING PROGRAM

## 2023-06-21 PROCEDURE — 6360000002 HC RX W HCPCS: Performed by: INTERNAL MEDICINE

## 2023-06-21 RX ORDER — METOCLOPRAMIDE 10 MG/1
10 TABLET ORAL EVERY 6 HOURS PRN
Qty: 120 TABLET | Refills: 0 | Status: SHIPPED | OUTPATIENT
Start: 2023-06-21

## 2023-06-21 RX ORDER — VANCOMYCIN HYDROCHLORIDE 125 MG/1
CAPSULE ORAL
Qty: 80 CAPSULE | Refills: 0 | Status: SHIPPED | OUTPATIENT
Start: 2023-06-21 | End: 2023-08-01

## 2023-06-21 RX ORDER — HEPARIN SODIUM 100 [USP'U]/ML
500 INJECTION, SOLUTION INTRAVENOUS PRN
Status: DISCONTINUED | OUTPATIENT
Start: 2023-06-21 | End: 2023-06-21 | Stop reason: HOSPADM

## 2023-06-21 RX ORDER — VANCOMYCIN HYDROCHLORIDE 125 MG/1
CAPSULE ORAL
Qty: 68 CAPSULE | Refills: 0 | Status: SHIPPED | OUTPATIENT
Start: 2023-06-21 | End: 2023-06-21 | Stop reason: SDUPTHER

## 2023-06-21 RX ADMIN — PANTOPRAZOLE SODIUM 40 MG: 40 INJECTION, POWDER, FOR SOLUTION INTRAVENOUS at 10:14

## 2023-06-21 RX ADMIN — Medication 125 MG: at 12:47

## 2023-06-21 RX ADMIN — PYRIDOSTIGMINE BROMIDE 90 MG: 60 TABLET ORAL at 05:46

## 2023-06-21 RX ADMIN — PYRIDOSTIGMINE BROMIDE 90 MG: 60 TABLET ORAL at 12:49

## 2023-06-21 RX ADMIN — Medication 125 MG: at 05:46

## 2023-06-21 RX ADMIN — Medication 1 CAPSULE: at 10:14

## 2023-06-21 RX ADMIN — Medication 500 UNITS: at 14:34

## 2023-06-21 ASSESSMENT — PAIN SCALES - GENERAL
PAINLEVEL_OUTOF10: 0
PAINLEVEL_OUTOF10: 0

## 2023-06-21 NOTE — CARE COORDINATION
Case Management Assessment            Discharge Note                    Date / Time of Note: 6/21/2023 1:57 PM                  Discharge Note Completed by: Michelle Lindsey RN    Patient Name: Meghan Salas   YOB: 1970  Diagnosis: Hypokalemia [E87.6]  Hypomagnesemia [E83.42]  Diverticulitis of colon [K57.32]  Diverticulitis [K57.92]   Date / Time: 6/16/2023 10:07 PM    Current PCP: No primary care provider on file. Clinic patient: No    Hospitalization in the last 30 days: Yes       Advance Directives:  Code Status: Full Code  [address] DNR form completed and on chart: Not Indicated    Financial:  Payor: Keiko Perla / Plan: [address] [place] [address] DEPT OF JOB / Product Type: *No Product type* /      Pharmacy:    [facility] [address] [phone] - F [phone]  [address] [place] [facility]  Phone: [phone] Fax: [phone]      Assistance purchasing medications?: Potential Assistance Purchasing Medications: No  Assistance provided by Case Management: None at this time    Does patient want to participate in local refill/ meds to beds program?:      Meds To Beds General Rules:  1. Can ONLY be done Monday- Friday between 8:30am-5pm  2. Prescription(s) must be in pharmacy by 3pm to be filled same day  3. Copy of patient's insurance/ prescription drug card and patient face sheet must be sent along with the prescription(s)  4. Cost of Rx cannot be added to hospital bill. If financial assistance is needed, please contact unit  or ;  or  CANNOT provide pharmacy voucher for patients co-pays  5.  Patients can then  the prescription on their way out of the hospital at discharge, or pharmacy can deliver to the bedside if staff is available. (payment due at time of pick-up or delivery - cash, check, or card accepted)     Able to afford home medications/ co-pay costs: Yes    ADLS:  Current PT AM-PAC Score:

## 2023-06-21 NOTE — DISCHARGE INSTR - DIET
Good nutrition is important when healing from an illness, injury, or surgery. Follow any nutrition recommendations given to you during your hospital stay. If you were given an oral nutrition supplement while in the hospital, continue to take this supplement at home. You can take it with meals, in-between meals, and/or before bedtime. These supplements can be purchased at most local grocery stores, pharmacies, and chain [employer]-stores. If you have any questions about your diet or nutrition, call the hospital and ask for the dietitian.     Clear diet, advance as tolerated

## 2023-06-21 NOTE — DISCHARGE SUMMARY
Discharge order received. Patient being DC home with family. All paperwork explained to patient. She verbalizes understanding and denies any questions. Port flushed with heparin and de-accessed. Patient taken to inpt pharmacy to get medications and to main entrance for family to . All belongings sent with patient.
[address] [facility] [phone] - F [phone]  [address]  [address] [place] [address]      Phone: [phone]   famotidine 20 MG tablet  metoclopramide 10 MG tablet  saccharomyces boulardii 250 MG capsule  vancomycin 125 MG capsule          Activity: activity as tolerated  Diet: regular diet  Wound Care: none needed    Time Spent on discharge is more than 15 minutes    Signed:  Aaron Duong  Internal Medicine Resident  PGY-1
Xeliselaz Pregnancy And Lactation Text: This medication is Pregnancy Category D and is not considered safe during pregnancy.  The risk during breast feeding is also uncertain.

## 2023-06-21 NOTE — PROGRESS NOTES
Comprehensive Nutrition Assessment    RECOMMENDATIONS:  PO Diet: Advance diet past CLD prior to d/c  ONS: Continue Ensure CLR TID  Nutrition Education: Education not indicated     NUTRITION ASSESSMENT:   Nutritional summary & status: Follow up: Pt continues on CLD, w/ Ensure ordered TID. PO has been variable, between 1-75%, drinking 100% of ONS. Pt has gastric emptying study done today, results are pending. Original plan for d/c today, however pt has been unable to advance past CLD. Recommend advancing diet pending gastric emptying results and adjusting ONS accordingly. Pt is Cdiff positive, had x5 BMs yesterday. Will continue to monitor. Admission/PMH: Diverticulitis, COPD, myasthenia gravis, electrolyte disturbances, Cdiff    MALNUTRITION ASSESSMENT  Context of Malnutrition: Chronic Illness   Malnutrition Status: Severe malnutrition  Findings of the 6 clinical characteristics of malnutrition (Minimum of 2 out of 6 clinical characteristics is required to make the diagnosis of moderate or severe Protein Calorie Malnutrition based on AND/ASPEN Guidelines):  Energy Intake:  75% or less estimated energy requirements for 1 month or longer  Weight Loss:  Greater than 10% over 6 months     Body Fat Loss:  Severe body fat loss Orbital, Buccal region   Muscle Mass Loss:  Severe muscle mass loss Temples (temporalis)  Fluid Accumulation:  No significant fluid accumulation      NUTRITION DIAGNOSIS   Severe malnutrition related to altered GI function as evidenced by Criteria as identified in malnutrition assessment    Nutrition Monitoring and Evaluation:   Food/Nutrient Intake Outcomes:  Food and Nutrient Intake, Supplement Intake, Diet Advancement/Tolerance  Physical Signs/Symptoms Outcomes:  Biochemical Data, Nutrition Focused Physical Findings, Weight, Diarrhea, GI Status, Nausea or Vomiting     OBJECTIVE DATA: Significant to nutrition assessment  Nutrition Related Findings: K 2.9. . Hyperactive bowel sounds.  +BM
Physician Progress Note      Kj Guzmán  CSN #:                  [id number]  :                       1970  ADMIT DATE:       2023 10:07 PM  [address] [place] DATE:  RESPONDING  PROVIDER #:        Katelyn Carreon MD          QUERY TEXT:    Patient admitted with Malnutrition. Noted documentation of severe malnutrition   in dietician consult note. If possible, please document in progress notes and   discharge summary if you are evaluating and /or treating any of the   following: The medical record reflects the following:  Risk Factors: 46 y/o with a hx of diverticulitis  Clinical Indicators: Per dietician consult note: Severe malnutrition:  Context of Malnutrition: Chronic Illness  Malnutrition Status: Severe malnutrition  Findings of the 6 clinical characteristics of malnutrition (Minimum of 2 out   of 6 clinical characteristics is required to make the diagnosis of moderate or   severe Protein Calorie Malnutrition based on AND/ASPEN Guidelines):  Energy Intake:  75% or less estimated energy requirements for 1 month or   longer  Weight Loss:  Greater than 10% over 6 months  Body Fat Loss:  Severe body fat loss Orbital, Buccal region  Muscle Mass Loss:  Severe muscle mass loss Temples (temporalis)  Fluid Accumulation:  No significant fluid accumulation  Treatment: Dietician consult:  PO Diet: Continue CLD and advance to FLD per MD  ONS: Add Ensure CLR TID      ASPEN Criteria:    https://[email or web address] onlinelibrary. soria. com/doi/full/10.1177/368207471741889  5  Options provided:  -- Protein calorie malnutrition severe  -- Other - I will add my own diagnosis  -- Disagree - Not applicable / Not valid  -- Disagree - Clinically unable to determine / Unknown  -- Refer to Clinical Documentation Reviewer    PROVIDER RESPONSE TEXT:    This patient has severe protein calorie malnutrition.     Query created by: Ginna Rashid on 2023 7:35 AM      Electronically signed by:  Katelyn Carreon MD 2023
Progress Note    Admit Date: 6/16/2023  Day: Hospital Day: 5  Diet: Diet NPO Exceptions are: Sips of Water with Meds    CC: Vomiting and diarrhea    Interval history:   NAEO. Pt off floor for gastric emptying study this AM. Will fully assess following study. Medications:     Scheduled Meds:   vancomycin  125 mg Oral 4 times per day    lactobacillus  1 capsule Oral Daily    pantoprazole  40 mg IntraVENous Daily    sodium chloride flush  5-40 mL IntraVENous 2 times per day    [Held by provider] enoxaparin  40 mg SubCUTAneous Daily    pyridostigmine  90 mg Oral 3 times per day    insulin lispro  0-8 Units SubCUTAneous TID WC    insulin lispro  0-4 Units SubCUTAneous Nightly     Continuous Infusions:   sodium chloride 10 mL/hr (06/18/23 0820)    lactated ringers IV soln 75 mL/hr at 06/19/23 0929    dextrose       PRN Meds:ipratropium 0.5 mg-albuterol 2.5 mg, metoclopramide, sodium chloride flush, sodium chloride, ondansetron **OR** ondansetron, polyethylene glycol, acetaminophen **OR** acetaminophen, albuterol, glucose, dextrose bolus **OR** dextrose bolus, glucagon (rDNA), dextrose    Objective:   Vitals:   T-max:  Patient Vitals for the past 8 hrs:   BP Temp Temp src Pulse Resp SpO2   06/20/23 0457 118/70 97.6 °F (36.4 °C) Oral 67 18 97 %         Intake/Output Summary (Last 24 hours) at 6/20/2023 0851  Last data filed at 6/20/2023 0457  Gross per 24 hour   Intake 2190 ml   Output 2800 ml   Net -610 ml       Physical Exam  Vitals and nursing note reviewed.    Deferred as currently undergoing study    LABS:    CBC:   Recent Labs     06/18/23  0620 06/19/23  0500 06/20/23  0600   WBC 6.1 6.8 5.7   HGB 11.3* 10.2* 10.9*   HCT 33.5* 30.1* 32.2*    141 142   MCV 92.9 94.8 93.0       Renal:    Recent Labs     06/18/23  0556 06/19/23  0500 06/20/23  0600    144 144   K 3.0*  3.0* 2.7*  2.9* 3.0*    109 107   CO2 25 25 27   BUN 6* 4* <2*   CREATININE <0.5* <0.5* <0.5*   GLUCOSE 95 88 86   CALCIUM 9.1
Progress Note    Admit Date: 6/16/2023  Day: Hospital Day: 6  Diet: ADULT ORAL NUTRITION SUPPLEMENT; Breakfast, Lunch, Dinner; Clear Liquid Oral Supplement  ADULT DIET; Regular; Low Fiber    CC: Vomiting and diarrhea    Interval history:   NAEO. Pt reports no full BM since her emptying study, but does report flatulence and scant liquid. States she has been taking PO slowly, but without issue so far. Discussed diet with patient at length at bedside. She reports she has been on a clear liquid diet with ensure supplements for roughly the past 6-8 weeks. She is followed by her gastroenterologist who is aware of her current diet management. He is in agreement per patient and will see her outpatient. Pt is able to tolerate solid PO, but has been choosing to avoid as much as possible as it causes abdominal discomfort.      Medications:     Scheduled Meds:   vancomycin  125 mg Oral 4 times per day    lactobacillus  1 capsule Oral Daily    pantoprazole  40 mg IntraVENous Daily    sodium chloride flush  5-40 mL IntraVENous 2 times per day    [Held by provider] enoxaparin  40 mg SubCUTAneous Daily    pyridostigmine  90 mg Oral 3 times per day    insulin lispro  0-8 Units SubCUTAneous TID WC    insulin lispro  0-4 Units SubCUTAneous Nightly     Continuous Infusions:   sodium chloride 10 mL/hr (06/18/23 0820)    lactated ringers IV soln 75 mL/hr at 06/20/23 1510    dextrose       PRN Meds:ipratropium 0.5 mg-albuterol 2.5 mg, metoclopramide, sodium chloride flush, sodium chloride, ondansetron **OR** ondansetron, polyethylene glycol, acetaminophen **OR** acetaminophen, albuterol, glucose, dextrose bolus **OR** dextrose bolus, glucagon (rDNA), dextrose    Objective:   Vitals:   T-max:  Patient Vitals for the past 8 hrs:   BP Temp Temp src Pulse Resp SpO2   06/21/23 0738 131/87 97.3 °F (36.3 °C) Oral 60 16 99 %   06/21/23 0449 126/71 98.1 °F (36.7 °C) Oral 76 16 --         Intake/Output Summary (Last 24 hours) at 6/21/2023
[id number], [id number] in the last 72 hours. INR: No results for input(s): INR in the last 72 hours. Lactate: No results for input(s): LACTATE in the last 72 hours. Cultures:  -----------------------------------------------------------------  RAD:   CT ABDOMEN PELVIS W IV CONTRAST Additional Contrast? None   Final Result   1. Acute sigmoid diverticulitis, uncomplicated. 2. Fluid attenuation extending from the region of diverticulitis to the left adnexa is thought to correspond to the left ovary. There is no rim enhancement to suggest abscess. 3. Benign-appearing bilateral renal cysts. No further follow-up is recommended. NM GASTRIC EMPTYING    (Results Pending)       Assessment/Plan:     Uncomplicated recurrent diverticulitis  Patient's last colonoscopy was in 2019, has been scheduled for multiple colonoscopies in the past but has been deemed unfit d/t recurrent inflammation. Of note, patient is a scheduled for a flex sig next week Friday and bowel resection surgery next month  - WBC 13.1  -CT abdomen pelvis with contrast revealing acute sigmoid diverticulitis, uncomplicated.   --Cosyntropin stim test, inflammatory workup  -- ESR 25, CRP 5.5  - Cont Zosyn  - IV fluids, LR at 75 mill per hour  - Clear liquid diet, advance diet as tolerated  - Pain management  - C diff indeterminate, pending PCR  - Prealbumin 18.5  -- Cx Nutrition     Myasthenia gravis  -Continue pyridostigmine 90 Mg p.o. 3 times daily  - Patient also receives IVIG every 3 weeks, last dose was Thursday, 6/15/2023     Stress cardiomyopathy  EF of 20 to 25% on echo from 9/22, echocardiogram on 12/22 EF 50-55%     Hypertension  Currently patient -95  - Holding home metoprolol 12.5, Aldactone 25 Mg  - Resume when appropriate     Hyperglycemia  Blood sugar 204  - Medium-dose sliding scale  -Hypoglycemia protocol  -Last A1c 5 months ago 5.4  -Repeat A1c     Hypomagnesemia  - Replace magnesium     Hypokalemia  - Replace potassium    Code

## 2023-06-21 NOTE — PLAN OF CARE
Problem: Safety - Adult  Goal: Free from fall injury  6/21/2023 0355 by Jerri Cabezas RN  Outcome: Progressing  6/20/2023 1550 by Klever Alfaro RN  Note: She is not a fall risk. Up ad kal in room. Gait steady.     pT remained safe throughout the night

## 2023-10-06 ENCOUNTER — HOSPITAL ENCOUNTER (EMERGENCY)
Age: 53
Discharge: HOME | End: 2023-10-07
Attending: EMERGENCY MEDICINE
Payer: MEDICARE

## 2023-10-06 ENCOUNTER — APPOINTMENT (OUTPATIENT)
Dept: CT IMAGING | Age: 53
End: 2023-10-06
Payer: MEDICARE

## 2023-10-06 DIAGNOSIS — R11.2 NAUSEA AND VOMITING, UNSPECIFIED VOMITING TYPE: Primary | ICD-10-CM

## 2023-10-06 DIAGNOSIS — N39.0 URINARY TRACT INFECTION WITHOUT HEMATURIA, SITE UNSPECIFIED: ICD-10-CM

## 2023-10-06 PROCEDURE — 6360000002 HC RX W HCPCS: Performed by: EMERGENCY MEDICINE

## 2023-10-06 PROCEDURE — 85025 COMPLETE CBC W/AUTO DIFF WBC: CPT

## 2023-10-06 PROCEDURE — 2580000003 HC RX 258: Performed by: EMERGENCY MEDICINE

## 2023-10-06 PROCEDURE — 99285 EMERGENCY DEPT VISIT HI MDM: CPT

## 2023-10-06 PROCEDURE — 83605 ASSAY OF LACTIC ACID: CPT

## 2023-10-06 PROCEDURE — 80053 COMPREHEN METABOLIC PANEL: CPT

## 2023-10-06 PROCEDURE — 83690 ASSAY OF LIPASE: CPT

## 2023-10-06 PROCEDURE — 2500000003 HC RX 250 WO HCPCS: Performed by: EMERGENCY MEDICINE

## 2023-10-06 PROCEDURE — 96374 THER/PROPH/DIAG INJ IV PUSH: CPT

## 2023-10-06 PROCEDURE — 96375 TX/PRO/DX INJ NEW DRUG ADDON: CPT

## 2023-10-06 PROCEDURE — A4216 STERILE WATER/SALINE, 10 ML: HCPCS | Performed by: EMERGENCY MEDICINE

## 2023-10-06 PROCEDURE — 96376 TX/PRO/DX INJ SAME DRUG ADON: CPT

## 2023-10-06 RX ORDER — ONDANSETRON 2 MG/ML
4 INJECTION INTRAMUSCULAR; INTRAVENOUS ONCE
Status: COMPLETED | OUTPATIENT
Start: 2023-10-06 | End: 2023-10-06

## 2023-10-06 RX ORDER — 0.9 % SODIUM CHLORIDE 0.9 %
1000 INTRAVENOUS SOLUTION INTRAVENOUS ONCE
Status: COMPLETED | OUTPATIENT
Start: 2023-10-06 | End: 2023-10-07

## 2023-10-06 RX ADMIN — ONDANSETRON 4 MG: 2 INJECTION INTRAMUSCULAR; INTRAVENOUS at 23:39

## 2023-10-06 RX ADMIN — FAMOTIDINE 20 MG: 10 INJECTION, SOLUTION INTRAVENOUS at 23:40

## 2023-10-06 RX ADMIN — SODIUM CHLORIDE 1000 ML: 9 INJECTION, SOLUTION INTRAVENOUS at 23:45

## 2023-10-06 ASSESSMENT — PAIN SCALES - GENERAL: PAINLEVEL_OUTOF10: 0

## 2023-10-07 ENCOUNTER — APPOINTMENT (OUTPATIENT)
Dept: CT IMAGING | Age: 53
End: 2023-10-07
Payer: MEDICARE

## 2023-10-07 VITALS
WEIGHT: 134 LBS | BODY MASS INDEX: 19.23 KG/M2 | RESPIRATION RATE: 18 BRPM | SYSTOLIC BLOOD PRESSURE: 128 MMHG | HEART RATE: 88 BPM | TEMPERATURE: 97.4 F | DIASTOLIC BLOOD PRESSURE: 72 MMHG | OXYGEN SATURATION: 100 %

## 2023-10-07 LAB
ALBUMIN SERPL-MCNC: 4.1 G/DL (ref 3.4–5)
ALBUMIN/GLOB SERPL: 1.2 {RATIO} (ref 1.1–2.2)
ALP SERPL-CCNC: 80 U/L (ref 40–129)
ALT SERPL-CCNC: 10 U/L (ref 10–40)
ANION GAP SERPL CALCULATED.3IONS-SCNC: 12 MMOL/L (ref 3–16)
AST SERPL-CCNC: 23 U/L (ref 15–37)
BACTERIA URNS QL MICRO: ABNORMAL /HPF
BASOPHILS # BLD: 0 K/UL (ref 0–0.2)
BASOPHILS NFR BLD: 0.3 %
BILIRUB SERPL-MCNC: 0.4 MG/DL (ref 0–1)
BILIRUB UR QL STRIP.AUTO: NEGATIVE
BUN SERPL-MCNC: 11 MG/DL (ref 7–20)
CALCIUM SERPL-MCNC: 9.3 MG/DL (ref 8.3–10.6)
CHLORIDE SERPL-SCNC: 103 MMOL/L (ref 99–110)
CLARITY UR: ABNORMAL
CO2 SERPL-SCNC: 24 MMOL/L (ref 21–32)
COLOR UR: YELLOW
CREAT SERPL-MCNC: <0.5 MG/DL (ref 0.6–1.1)
DEPRECATED RDW RBC AUTO: 13.9 % (ref 12.4–15.4)
EOSINOPHIL # BLD: 0 K/UL (ref 0–0.6)
EOSINOPHIL NFR BLD: 0 %
EPI CELLS #/AREA URNS HPF: ABNORMAL /HPF (ref 0–5)
GFR SERPLBLD CREATININE-BSD FMLA CKD-EPI: >60 ML/MIN/{1.73_M2}
GLUCOSE SERPL-MCNC: 177 MG/DL (ref 70–99)
GLUCOSE UR STRIP.AUTO-MCNC: NEGATIVE MG/DL
HCT VFR BLD AUTO: 38.7 % (ref 36–48)
HGB BLD-MCNC: 13.2 G/DL (ref 12–16)
HGB UR QL STRIP.AUTO: NEGATIVE
KETONES UR STRIP.AUTO-MCNC: ABNORMAL MG/DL
LACTATE BLDV-SCNC: 1.9 MMOL/L (ref 0.4–1.9)
LEUKOCYTE ESTERASE UR QL STRIP.AUTO: NEGATIVE
LIPASE SERPL-CCNC: 15 U/L (ref 13–60)
LYMPHOCYTES # BLD: 1.1 K/UL (ref 1–5.1)
LYMPHOCYTES NFR BLD: 8.3 %
MCH RBC QN AUTO: 30.8 PG (ref 26–34)
MCHC RBC AUTO-ENTMCNC: 34.1 G/DL (ref 31–36)
MCV RBC AUTO: 90.4 FL (ref 80–100)
MONOCYTES # BLD: 0.3 K/UL (ref 0–1.3)
MONOCYTES NFR BLD: 2 %
NEUTROPHILS # BLD: 12.4 K/UL (ref 1.7–7.7)
NEUTROPHILS NFR BLD: 89.4 %
NITRITE UR QL STRIP.AUTO: POSITIVE
PH UR STRIP.AUTO: 7 [PH] (ref 5–8)
PLATELET # BLD AUTO: 208 K/UL (ref 135–450)
PMV BLD AUTO: 8.6 FL (ref 5–10.5)
POTASSIUM SERPL-SCNC: 3.6 MMOL/L (ref 3.5–5.1)
PROT SERPL-MCNC: 7.4 G/DL (ref 6.4–8.2)
PROT UR STRIP.AUTO-MCNC: NEGATIVE MG/DL
RBC # BLD AUTO: 4.28 M/UL (ref 4–5.2)
RBC #/AREA URNS HPF: ABNORMAL /HPF (ref 0–4)
SODIUM SERPL-SCNC: 139 MMOL/L (ref 136–145)
SP GR UR STRIP.AUTO: 1.01 (ref 1–1.03)
UA COMPLETE W REFLEX CULTURE PNL UR: YES
UA DIPSTICK W REFLEX MICRO PNL UR: YES
URN SPEC COLLECT METH UR: ABNORMAL
UROBILINOGEN UR STRIP-ACNC: 0.2 E.U./DL
WBC # BLD AUTO: 13.9 K/UL (ref 4–11)
WBC #/AREA URNS HPF: ABNORMAL /HPF (ref 0–5)

## 2023-10-07 PROCEDURE — 81001 URINALYSIS AUTO W/SCOPE: CPT

## 2023-10-07 PROCEDURE — 6370000000 HC RX 637 (ALT 250 FOR IP): Performed by: EMERGENCY MEDICINE

## 2023-10-07 PROCEDURE — 87186 SC STD MICRODIL/AGAR DIL: CPT

## 2023-10-07 PROCEDURE — 87086 URINE CULTURE/COLONY COUNT: CPT

## 2023-10-07 PROCEDURE — 87077 CULTURE AEROBIC IDENTIFY: CPT

## 2023-10-07 PROCEDURE — 74177 CT ABD & PELVIS W/CONTRAST: CPT

## 2023-10-07 PROCEDURE — 6360000002 HC RX W HCPCS: Performed by: EMERGENCY MEDICINE

## 2023-10-07 PROCEDURE — 6360000004 HC RX CONTRAST MEDICATION: Performed by: EMERGENCY MEDICINE

## 2023-10-07 RX ORDER — AMOXICILLIN AND CLAVULANATE POTASSIUM 875; 125 MG/1; MG/1
1 TABLET, FILM COATED ORAL ONCE
Status: COMPLETED | OUTPATIENT
Start: 2023-10-07 | End: 2023-10-07

## 2023-10-07 RX ORDER — AMOXICILLIN AND CLAVULANATE POTASSIUM 875; 125 MG/1; MG/1
1 TABLET, FILM COATED ORAL 2 TIMES DAILY
Qty: 14 TABLET | Refills: 0 | Status: SHIPPED | OUTPATIENT
Start: 2023-10-07 | End: 2023-10-14

## 2023-10-07 RX ORDER — ONDANSETRON 2 MG/ML
4 INJECTION INTRAMUSCULAR; INTRAVENOUS ONCE
Status: COMPLETED | OUTPATIENT
Start: 2023-10-07 | End: 2023-10-07

## 2023-10-07 RX ORDER — PROMETHAZINE HYDROCHLORIDE 25 MG/1
25 SUPPOSITORY RECTAL EVERY 6 HOURS PRN
Qty: 10 SUPPOSITORY | Refills: 0 | Status: SHIPPED | OUTPATIENT
Start: 2023-10-07 | End: 2023-10-14

## 2023-10-07 RX ORDER — PROCHLORPERAZINE EDISYLATE 5 MG/ML
5 INJECTION INTRAMUSCULAR; INTRAVENOUS ONCE
Status: COMPLETED | OUTPATIENT
Start: 2023-10-07 | End: 2023-10-07

## 2023-10-07 RX ADMIN — ONDANSETRON 4 MG: 2 INJECTION INTRAMUSCULAR; INTRAVENOUS at 00:54

## 2023-10-07 RX ADMIN — PROCHLORPERAZINE EDISYLATE 5 MG: 5 INJECTION INTRAMUSCULAR; INTRAVENOUS at 00:53

## 2023-10-07 RX ADMIN — IOHEXOL 100 ML: 350 INJECTION, SOLUTION INTRAVENOUS at 00:42

## 2023-10-07 RX ADMIN — AMOXICILLIN AND CLAVULANATE POTASSIUM 1 TABLET: 875; 125 TABLET, FILM COATED ORAL at 02:35

## 2023-10-07 NOTE — ED TRIAGE NOTES
Pt to ED c/o nausea/vomiting that started around 1600 today after a colonoscopy. Hx of bowel resection in July of this year. Pt awake and alert.

## 2023-10-07 NOTE — DISCHARGE INSTRUCTIONS
Clear liquid diet for 24 hours then advance slowly. Continue zofran and phenergan as needed.  Return for fever, flank pain, increased abdominal pain, no ileostomy output, persistent vomiting

## 2023-10-07 NOTE — ED NOTES
Pt discharged to home. 2 prescriptions escribed to pt pharmacy. Discharge paperwork discussed. All questions and concerns answered at this time. Pt awake and alert. Respirations even and unlabored.       Abbie Apple RN  10/07/23 1629

## 2023-10-08 LAB
BACTERIA UR CULT: ABNORMAL
ORGANISM: ABNORMAL

## 2023-10-09 LAB
BACTERIA UR CULT: ABNORMAL
ORGANISM: ABNORMAL

## 2024-02-01 NOTE — PLAN OF CARE
Problem: Pain  Goal: Verbalizes/displays adequate comfort level or baseline comfort level  Description: Patient pain level 1/10  12/21/2022 1843 by Anju Sandhu RN  Outcome: Progressing   Patient rated discomfort at 0-1/10, throughout the day originating from abd. Rest and repositioned as needed. Patient informed to notify nursing staff for any changes in discomfort. Will continue to monitor. Age appropriate behavior/Recognizes caregiver